# Patient Record
Sex: FEMALE | Race: WHITE | URBAN - METROPOLITAN AREA
[De-identification: names, ages, dates, MRNs, and addresses within clinical notes are randomized per-mention and may not be internally consistent; named-entity substitution may affect disease eponyms.]

---

## 2021-09-10 ENCOUNTER — OFFICE VISIT (OUTPATIENT)
Dept: INTERNAL MEDICINE CLINIC | Age: 66
End: 2021-09-10
Payer: COMMERCIAL

## 2021-09-10 VITALS
HEART RATE: 78 BPM | WEIGHT: 222.6 LBS | OXYGEN SATURATION: 97 % | SYSTOLIC BLOOD PRESSURE: 136 MMHG | HEIGHT: 65 IN | BODY MASS INDEX: 37.09 KG/M2 | DIASTOLIC BLOOD PRESSURE: 74 MMHG

## 2021-09-10 DIAGNOSIS — G47.33 OSA (OBSTRUCTIVE SLEEP APNEA): Primary | ICD-10-CM

## 2021-09-10 DIAGNOSIS — E11.69 TYPE 2 DIABETES MELLITUS WITH OTHER SPECIFIED COMPLICATION, WITH LONG-TERM CURRENT USE OF INSULIN (HCC): ICD-10-CM

## 2021-09-10 DIAGNOSIS — R53.83 OTHER FATIGUE: ICD-10-CM

## 2021-09-10 DIAGNOSIS — I10 ESSENTIAL HYPERTENSION: ICD-10-CM

## 2021-09-10 DIAGNOSIS — Z79.4 TYPE 2 DIABETES MELLITUS WITH OTHER SPECIFIED COMPLICATION, WITH LONG-TERM CURRENT USE OF INSULIN (HCC): ICD-10-CM

## 2021-09-10 DIAGNOSIS — E66.9 OBESITY, UNSPECIFIED CLASSIFICATION, UNSPECIFIED OBESITY TYPE, UNSPECIFIED WHETHER SERIOUS COMORBIDITY PRESENT: ICD-10-CM

## 2021-09-10 PROCEDURE — 99204 OFFICE O/P NEW MOD 45 MIN: CPT | Performed by: INTERNAL MEDICINE

## 2021-09-10 RX ORDER — DULAGLUTIDE 1.5 MG/.5ML
1.5 INJECTION, SOLUTION SUBCUTANEOUS WEEKLY
COMMUNITY

## 2021-09-10 SDOH — ECONOMIC STABILITY: TRANSPORTATION INSECURITY
IN THE PAST 12 MONTHS, HAS THE LACK OF TRANSPORTATION KEPT YOU FROM MEDICAL APPOINTMENTS OR FROM GETTING MEDICATIONS?: NO

## 2021-09-10 SDOH — ECONOMIC STABILITY: FOOD INSECURITY: WITHIN THE PAST 12 MONTHS, YOU WORRIED THAT YOUR FOOD WOULD RUN OUT BEFORE YOU GOT MONEY TO BUY MORE.: NEVER TRUE

## 2021-09-10 SDOH — ECONOMIC STABILITY: TRANSPORTATION INSECURITY
IN THE PAST 12 MONTHS, HAS LACK OF TRANSPORTATION KEPT YOU FROM MEETINGS, WORK, OR FROM GETTING THINGS NEEDED FOR DAILY LIVING?: NO

## 2021-09-10 SDOH — ECONOMIC STABILITY: FOOD INSECURITY: WITHIN THE PAST 12 MONTHS, THE FOOD YOU BOUGHT JUST DIDN'T LAST AND YOU DIDN'T HAVE MONEY TO GET MORE.: NEVER TRUE

## 2021-09-10 ASSESSMENT — PATIENT HEALTH QUESTIONNAIRE - PHQ9
SUM OF ALL RESPONSES TO PHQ QUESTIONS 1-9: 0
1. LITTLE INTEREST OR PLEASURE IN DOING THINGS: 0
2. FEELING DOWN, DEPRESSED OR HOPELESS: 0
SUM OF ALL RESPONSES TO PHQ QUESTIONS 1-9: 0
SUM OF ALL RESPONSES TO PHQ9 QUESTIONS 1 & 2: 0
SUM OF ALL RESPONSES TO PHQ QUESTIONS 1-9: 0

## 2021-09-10 ASSESSMENT — SOCIAL DETERMINANTS OF HEALTH (SDOH): HOW HARD IS IT FOR YOU TO PAY FOR THE VERY BASICS LIKE FOOD, HOUSING, MEDICAL CARE, AND HEATING?: NOT HARD AT ALL

## 2021-09-10 NOTE — PROGRESS NOTES
Subjective:      Patient ID: Burnilda Gallardo is a 77 y.o. female. HPI   Patient is here to establishing care , she has H/o DM on Lantus 90 units BID, Novalog, Truticity , cannot tolerate Metformin   Last HABIc was 8.5 in 6 weeks   Follows with Ophthalmologist   She has HTN on Norvasc , Does not know whether she can take ACE inhibitor or not   She has Single Kidney   She uses CPAP , has WILLARD   HPI   1) Location/Symptom - fatigue   2) Timing/Onset: since COVID in NOV, 2020   3) Context/Setting:she initially gets better after COVID , but then it gets better    4) Quality: Sleepy all day   5) Duration: continuous   6) Modifying Factors: None   7) Severity: moderate   [place] feels Depressed   Go to sleep easily , while Working in [employer]   Last Sleep study was > 5 year , Not very compliant with CPAP   Review of Systems   Constitutional: Positive for fatigue. Negative for activity change, appetite change, chills, diaphoresis and fever. HENT: Negative for congestion, dental problem, drooling and ear discharge. Eyes: Negative for pain, discharge, redness and itching. Respiratory: Negative for apnea, cough, choking, chest tightness and shortness of breath. Cardiovascular: Negative for chest pain and leg swelling. Gastrointestinal: Negative for abdominal distention, abdominal pain, blood in stool, constipation and diarrhea. Endocrine: Negative for cold intolerance and heat intolerance. Genitourinary: Negative for difficulty urinating, dysuria, enuresis, flank pain and frequency. Musculoskeletal: Negative for arthralgias, back pain, gait problem and joint swelling. Skin: Negative for color change, pallor and rash. Neurological: Negative for dizziness, facial asymmetry, light-headedness, numbness and headaches. Psychiatric/Behavioral: Positive for sleep disturbance. Negative for agitation, behavioral problems, confusion, decreased concentration and dysphoric mood.        Objective:   Physical Exam  Constitutional:       Appearance: She is well-developed. She is obese. She is not diaphoretic. HENT:      Head: Normocephalic and atraumatic. Mouth/Throat:      Pharynx: No oropharyngeal exudate. Eyes:      General: No scleral icterus. Right eye: No discharge. Left eye: No discharge. Conjunctiva/sclera: Conjunctivae normal.      Pupils: Pupils are equal, round, and reactive to light. Neck:      Thyroid: No thyromegaly. Vascular: No JVD. Trachea: No tracheal deviation. Comments: Thick neck. Cardiovascular:      Rate and Rhythm: Normal rate. Heart sounds: Normal heart sounds. No murmur heard. No gallop. Pulmonary:      Effort: Pulmonary effort is normal. No respiratory distress. Breath sounds: Normal breath sounds. No stridor. No wheezing or rales. Chest:      Chest wall: No tenderness. Abdominal:      General: Bowel sounds are normal. There is no distension. Palpations: Abdomen is soft. Tenderness: There is no abdominal tenderness. There is no guarding or rebound. Musculoskeletal:         General: Normal range of motion. Cervical back: Normal range of motion. Neurological:      Mental Status: She is alert and oriented to person, place, and time.          Assessment / Plan:     Social History     Socioeconomic History    Marital status:      Spouse name: None    Number of children: None    Years of education: None    Highest education level: None   Occupational History    None   Tobacco Use    Smoking status: Never Smoker    Smokeless tobacco: Never Used   Substance and Sexual Activity    Alcohol use: No    Drug use: No    Sexual activity: None   Other Topics Concern    None   Social History Narrative    None     Social Determinants of Health     Financial Resource Strain: Low Risk     Difficulty of Paying Living Expenses: Not hard at all   Food Insecurity: No Food Insecurity    Worried About Running Out of Food in the Last Year: Never true    [address] in the Last Year: Never true   Transportation Needs: No Transportation Needs    Lack of Transportation (Medical): No    Lack of Transportation (Non-Medical): No   Physical Activity:     Days of Exercise per Week:     Minutes of Exercise per Session:    Stress:     Feeling of Stress :    Social Connections:     Frequency of Communication with Friends and Family:     Frequency of Social Gatherings with Friends and Family:     Attends [employer] Services:     Active Member of Clubs or Organizations:     Attends Club or Organization Meetings:     Marital Status:    Intimate Partner Violence:     Fear of Current or Ex-Partner:     Emotionally Abused:     Physically Abused:     Sexually Abused:         No family history on file. Visit Information    Have you changed or started any medications since your last visit including any over-the-counter medicines, vitamins, or herbal medicines? no   Are you having any side effects from any of your medications? -  no  Have you stopped taking any of your medications? Is so, why? -  no    Have you seen any other physician or provider since your last visit? Yes - Records Obtained  Have you had any other diagnostic tests since your last visit? Yes - Records Obtained  Have you been seen in the emergency room and/or had an admission to a hospital since we last saw you? No  Have you had your routine dental cleaning in the past 6 months? yes -     Have you activated your [employer] account? If not, what are your barriers?  No:      Patient Care Team:  An Hines as PCP - General    Medical History Review  Past Medical, Family, and Social History reviewed and does not contribute to the patient presenting condition    Health Maintenance   Topic Date Due    Hepatitis C screen  Never done    Lipid screen  Never done    DTaP/Tdap/Td vaccine (1 - Tdap) Never done    Colon cancer screen colonoscopy  Never done   [place] Breast cancer screen  Never done    DEXA (modify frequency per FRAX score)  Never done    Pneumococcal 65+ years Vaccine (2 of 2 - PPSV23) 06/26/2020    Shingles Vaccine (2 of 2) 01/27/2021    Flu vaccine (1) Never done    COVID-19 Vaccine  Completed    Hepatitis A vaccine  Aged Out    Hepatitis B vaccine  Aged Out    Hib vaccine  Aged Out    Meningococcal (ACWY) vaccine  Aged Out     1. WILLARD (obstructive sleep apnea)  - Sleep Study with PAP Titration; Future    2. Other fatigue  Advised patient to get sleep study done  - TSH With Reflex Ft4; Future  - CBC; Future  - Comprehensive Metabolic Panel; Future  - Vitamin D 25 Hydroxy; Future  - Vitamin B12 & Folate; Future    3. Essential hypertension  Controlled    4. Type 2 diabetes mellitus with other specified complication, with long-term current use of insulin (HCC)  Last HbA1c is 8.5  Advised to be more compliant with diet and medication    5. Obesity, unspecified classification, unspecified obesity type, unspecified whether serious comorbidity present  Advised to lose weight      · Return in about 6 weeks (around 10/22/2021). · Reviewed prior labs and health maintenance. · Discussed use, benefit, and side effects of prescribed medications. Barriers to medication compliance addressed. All patient questions answered. Pt voiced understanding. Carl Mabry MD  [facility]  9/14/2021, 6:52 PM    Please note that this chart was generated using voice recognition Dragon dictation software. Although every effort was made to ensure the accuracy of this automated transcription, some errors in transcription may have occurred.

## 2021-09-14 ASSESSMENT — ENCOUNTER SYMPTOMS
EYE REDNESS: 0
SHORTNESS OF BREATH: 0
BACK PAIN: 0
ABDOMINAL PAIN: 0
CHOKING: 0
COUGH: 0
BLOOD IN STOOL: 0
EYE DISCHARGE: 0
COLOR CHANGE: 0
CHEST TIGHTNESS: 0
DIARRHEA: 0
ABDOMINAL DISTENTION: 0
CONSTIPATION: 0
APNEA: 0
EYE ITCHING: 0
EYE PAIN: 0

## 2021-09-24 DIAGNOSIS — Z12.31 ENCOUNTER FOR SCREENING MAMMOGRAM FOR MALIGNANT NEOPLASM OF BREAST: Primary | ICD-10-CM

## 2021-10-05 LAB
ABSOLUTE BASO #: 0 X10E9/L (ref 0–0.2)
ABSOLUTE EOS #: 0.2 X10E9/L (ref 0–0.4)
ABSOLUTE LYMPH #: 1.7 X10E9/L (ref 1–3.5)
ABSOLUTE MONO #: 0.5 X10E9/L (ref 0–0.9)
ABSOLUTE NEUT #: 5.6 X10E9/L (ref 1.5–6.6)
ALBUMIN SERPL-MCNC: 3.9 G/DL (ref 3.2–5.3)
ALK PHOSPHATASE: 105 U/L (ref 39–130)
ALT SERPL-CCNC: 14 U/L (ref 0–31)
ANION GAP SERPL CALCULATED.3IONS-SCNC: 10 MMOL/L (ref 5–15)
AST SERPL-CCNC: 13 U/L (ref 0–41)
BASOPHILS RELATIVE PERCENT: 0.4 %
BILIRUB SERPL-MCNC: 0.7 MG/DL (ref 0.3–1.2)
BUN BLDV-MCNC: 12 MG/DL (ref 5–27)
CALCIUM SERPL-MCNC: 9.2 MG/DL (ref 8.5–10.5)
CHLORIDE BLD-SCNC: 103 MMOL/L (ref 98–109)
CO2: 27 MMOL/L (ref 22–32)
CREAT SERPL-MCNC: 0.88 MG/DL (ref 0.4–1)
EGFR AFRICAN AMERICAN: >60 ML/MIN/1.73SQ.M
EGFR IF NONAFRICAN AMERICAN: >60 ML/MIN/1.73SQ.M
EOSINOPHILS RELATIVE PERCENT: 2 %
FOLATE: 9.7 NG/ML
GLUCOSE: 271 MG/DL (ref 65–99)
HCT VFR BLD CALC: 42.7 % (ref 35–47)
HEMOGLOBIN: 14.5 G/DL (ref 11.7–15.5)
LYMPHOCYTE %: 21.6 %
MCH RBC QN AUTO: 30.7 PG (ref 27–34)
MCHC RBC AUTO-ENTMCNC: 34 G/DL (ref 32–36)
MCV RBC AUTO: 91 FL (ref 80–100)
MONOCYTES # BLD: 6 %
NEUTROPHILS RELATIVE PERCENT: 70 %
PDW BLD-RTO: 13.7 % (ref 11.5–15)
PLATELETS: 255 X10E9/L (ref 150–450)
PMV BLD AUTO: 7.8 FL (ref 7–12)
POTASSIUM SERPL-SCNC: 4.3 MMOL/L (ref 3.5–5)
RBC: 4.72 X10E12/L (ref 3.8–5.2)
SODIUM BLD-SCNC: 140 MMOL/L (ref 134–146)
TOTAL PROTEIN: 7 G/DL (ref 6–8)
TSH SERPL DL<=0.05 MIU/L-ACNC: 3.66 UIU/ML (ref 0.49–4.67)
VITAMIN B-12: 186 PG/ML (ref 180–914)
VITAMIN D 25-HYDROXY: 31.3 NG/ML (ref 30–100)
WBC: 8 X10E9/L (ref 4–11)

## 2021-10-06 ENCOUNTER — TELEPHONE (OUTPATIENT)
Dept: INTERNAL MEDICINE CLINIC | Age: 66
End: 2021-10-06

## 2021-10-06 DIAGNOSIS — E53.8 VITAMIN B12 DEFICIENCY: Primary | ICD-10-CM

## 2021-12-09 DIAGNOSIS — Z12.31 ENCOUNTER FOR SCREENING MAMMOGRAM FOR MALIGNANT NEOPLASM OF BREAST: ICD-10-CM

## 2022-03-03 DIAGNOSIS — E53.8 VITAMIN B12 DEFICIENCY: ICD-10-CM

## 2022-03-04 RX ORDER — PSYLLIUM HUSK 3.4 G/7G
POWDER ORAL
Qty: 30 TABLET | Refills: 1 | Status: SHIPPED | OUTPATIENT
Start: 2022-03-04 | End: 2022-05-06

## 2022-05-06 DIAGNOSIS — E53.8 VITAMIN B12 DEFICIENCY: ICD-10-CM

## 2022-05-06 RX ORDER — LANOLIN ALCOHOL/MO/W.PET/CERES
CREAM (GRAM) TOPICAL
Qty: 90 TABLET | Refills: 3 | Status: SHIPPED | OUTPATIENT
Start: 2022-05-06

## 2023-04-04 LAB
AVERAGE GLUCOSE: NORMAL
HBA1C MFR BLD: 9.1 %

## 2023-06-13 ENCOUNTER — OFFICE VISIT (OUTPATIENT)
Age: 68
End: 2023-06-13
Payer: COMMERCIAL

## 2023-06-13 VITALS
BODY MASS INDEX: 39.95 KG/M2 | SYSTOLIC BLOOD PRESSURE: 149 MMHG | HEIGHT: 64 IN | RESPIRATION RATE: 16 BRPM | TEMPERATURE: 98.4 F | WEIGHT: 234 LBS | HEART RATE: 66 BPM | DIASTOLIC BLOOD PRESSURE: 73 MMHG

## 2023-06-13 DIAGNOSIS — I10 PRIMARY HYPERTENSION: ICD-10-CM

## 2023-06-13 DIAGNOSIS — B35.4 TINEA CORPORIS: Primary | ICD-10-CM

## 2023-06-13 PROCEDURE — 1123F ACP DISCUSS/DSCN MKR DOCD: CPT | Performed by: STUDENT IN AN ORGANIZED HEALTH CARE EDUCATION/TRAINING PROGRAM

## 2023-06-13 PROCEDURE — 3078F DIAST BP <80 MM HG: CPT | Performed by: STUDENT IN AN ORGANIZED HEALTH CARE EDUCATION/TRAINING PROGRAM

## 2023-06-13 PROCEDURE — 3074F SYST BP LT 130 MM HG: CPT | Performed by: STUDENT IN AN ORGANIZED HEALTH CARE EDUCATION/TRAINING PROGRAM

## 2023-06-13 PROCEDURE — 99213 OFFICE O/P EST LOW 20 MIN: CPT | Performed by: STUDENT IN AN ORGANIZED HEALTH CARE EDUCATION/TRAINING PROGRAM

## 2023-06-13 RX ORDER — DULAGLUTIDE 0.75 MG/.5ML
0.75 INJECTION, SOLUTION SUBCUTANEOUS
COMMUNITY
Start: 2023-04-04

## 2023-06-13 RX ORDER — KETOCONAZOLE 20 MG/G
1 CREAM TOPICAL 2 TIMES DAILY
Qty: 60 G | Refills: 0 | Status: SHIPPED | OUTPATIENT
Start: 2023-06-13 | End: 2023-06-27

## 2023-06-13 RX ORDER — EPINEPHRINE 0.3 MG/.3ML
0.3 INJECTION SUBCUTANEOUS AS NEEDED
COMMUNITY
Start: 2021-06-01 | End: 2023-06-13 | Stop reason: SDUPTHER

## 2023-06-13 RX ORDER — AMLODIPINE BESYLATE AND ATORVASTATIN CALCIUM 2.5; 4 MG/1; MG/1
TABLET, FILM COATED ORAL
COMMUNITY
End: 2023-06-20

## 2023-06-13 RX ORDER — LEVOTHYROXINE SODIUM 0.03 MG/1
1 TABLET ORAL DAILY
COMMUNITY
Start: 2023-06-08

## 2023-06-13 RX ORDER — AMLODIPINE BESYLATE 5 MG/1
1 TABLET ORAL DAILY
COMMUNITY
Start: 2023-06-08

## 2023-06-13 RX ORDER — SPIRONOLACTONE 25 MG/1
1 TABLET ORAL DAILY
COMMUNITY
Start: 2023-06-04

## 2023-06-13 RX ORDER — CARVEDILOL 25 MG/1
1 TABLET ORAL 2 TIMES DAILY
COMMUNITY
Start: 2023-06-08

## 2023-06-13 RX ORDER — PEN NEEDLE, DIABETIC 29 G X1/2"
NEEDLE, DISPOSABLE MISCELLANEOUS
COMMUNITY
Start: 2023-04-12

## 2023-06-13 RX ORDER — ERYTHROMYCIN 20 MG/G
GEL TOPICAL
COMMUNITY
Start: 2023-05-26

## 2023-06-13 SDOH — ECONOMIC STABILITY: INCOME INSECURITY: HOW HARD IS IT FOR YOU TO PAY FOR THE VERY BASICS LIKE FOOD, HOUSING, MEDICAL CARE, AND HEATING?: NOT HARD AT ALL

## 2023-06-13 SDOH — ECONOMIC STABILITY: FOOD INSECURITY: WITHIN THE PAST 12 MONTHS, YOU WORRIED THAT YOUR FOOD WOULD RUN OUT BEFORE YOU GOT MONEY TO BUY MORE.: NEVER TRUE

## 2023-06-13 SDOH — ECONOMIC STABILITY: HOUSING INSECURITY
IN THE LAST 12 MONTHS, WAS THERE A TIME WHEN YOU DID NOT HAVE A STEADY PLACE TO SLEEP OR SLEPT IN A SHELTER (INCLUDING NOW)?: NO

## 2023-06-13 SDOH — ECONOMIC STABILITY: FOOD INSECURITY: WITHIN THE PAST 12 MONTHS, THE FOOD YOU BOUGHT JUST DIDN'T LAST AND YOU DIDN'T HAVE MONEY TO GET MORE.: NEVER TRUE

## 2023-06-13 ASSESSMENT — PATIENT HEALTH QUESTIONNAIRE - PHQ9
SUM OF ALL RESPONSES TO PHQ QUESTIONS 1-9: 0
SUM OF ALL RESPONSES TO PHQ9 QUESTIONS 1 & 2: 0
SUM OF ALL RESPONSES TO PHQ QUESTIONS 1-9: 0
1. LITTLE INTEREST OR PLEASURE IN DOING THINGS: 0
SUM OF ALL RESPONSES TO PHQ QUESTIONS 1-9: 0
SUM OF ALL RESPONSES TO PHQ QUESTIONS 1-9: 0
2. FEELING DOWN, DEPRESSED OR HOPELESS: 0

## 2023-06-16 ASSESSMENT — ENCOUNTER SYMPTOMS
COUGH: 0
ABDOMINAL PAIN: 0
ABDOMINAL DISTENTION: 0
SHORTNESS OF BREATH: 0

## 2023-07-07 DIAGNOSIS — E78.5 HYPERLIPIDEMIA, UNSPECIFIED HYPERLIPIDEMIA TYPE: Primary | ICD-10-CM

## 2023-07-07 RX ORDER — PROCHLORPERAZINE 25 MG/1
1 SUPPOSITORY RECTAL
COMMUNITY
Start: 2023-04-04

## 2023-07-12 DIAGNOSIS — I10 PRIMARY HYPERTENSION: Primary | ICD-10-CM

## 2023-07-12 DIAGNOSIS — E03.9 HYPOTHYROIDISM, UNSPECIFIED TYPE: ICD-10-CM

## 2023-07-12 DIAGNOSIS — E78.5 HYPERLIPIDEMIA, UNSPECIFIED HYPERLIPIDEMIA TYPE: ICD-10-CM

## 2023-07-12 RX ORDER — AMLODIPINE BESYLATE 5 MG/1
5 TABLET ORAL DAILY
Qty: 60 TABLET | Refills: 0 | Status: SHIPPED | OUTPATIENT
Start: 2023-07-12

## 2023-07-12 RX ORDER — LEVOTHYROXINE SODIUM 0.03 MG/1
25 TABLET ORAL EVERY MORNING
Qty: 60 TABLET | Refills: 0 | Status: SHIPPED | OUTPATIENT
Start: 2023-07-12

## 2023-07-12 RX ORDER — CARVEDILOL 25 MG/1
25 TABLET ORAL 2 TIMES DAILY WITH MEALS
Qty: 120 TABLET | Refills: 0 | Status: SHIPPED | OUTPATIENT
Start: 2023-07-12

## 2023-07-17 LAB
AVERAGE GLUCOSE: NORMAL
HBA1C MFR BLD: 10.3 %

## 2023-07-19 ENCOUNTER — TELEPHONE (OUTPATIENT)
Age: 68
End: 2023-07-19

## 2023-07-19 DIAGNOSIS — E78.5 HYPERLIPIDEMIA, UNSPECIFIED HYPERLIPIDEMIA TYPE: Primary | ICD-10-CM

## 2023-07-19 RX ORDER — PITAVASTATIN CALCIUM 4.18 MG/1
4 TABLET, FILM COATED ORAL NIGHTLY
Qty: 30 TABLET | Refills: 0 | Status: SHIPPED | OUTPATIENT
Start: 2023-07-19

## 2023-07-19 NOTE — TELEPHONE ENCOUNTER
REC'D CALL FROM [facility] TO DUE A PRIOR AUTH OVER THE PHONE.  DONE, CASE ID #[id number] AND THEY SHOULD HAVE ANSWER WITHIN 72 HOURS.

## 2023-07-19 NOTE — TELEPHONE ENCOUNTER
There is a delay with mail order pharmacy sending her Livalo. Asking if you would send a month supply for her local pharmacy (she is in [facility] for the next 2 weeks). Please send to [facility] in [place], [place]. Mail order said she should receive her order in 2 wees.

## 2023-07-27 ENCOUNTER — TELEPHONE (OUTPATIENT)
Age: 68
End: 2023-07-27

## 2023-07-27 NOTE — TELEPHONE ENCOUNTER
Patient was recently seen at [facility] adult endocrinology, please see care everywhere for progress note

## 2023-07-28 DIAGNOSIS — E78.5 HYPERLIPIDEMIA, UNSPECIFIED HYPERLIPIDEMIA TYPE: ICD-10-CM

## 2023-08-09 DIAGNOSIS — I10 PRIMARY HYPERTENSION: Primary | ICD-10-CM

## 2023-08-09 RX ORDER — SPIRONOLACTONE 25 MG/1
25 TABLET ORAL DAILY
Qty: 90 TABLET | Refills: 0 | Status: SHIPPED | OUTPATIENT
Start: 2023-08-09

## 2023-08-14 ASSESSMENT — ENCOUNTER SYMPTOMS
ABDOMINAL PAIN: 0
SORE THROAT: 0
SHORTNESS OF BREATH: 0
DIARRHEA: 0
WHEEZING: 0
COUGH: 0
BACK PAIN: 0
EYE PAIN: 0
CONSTIPATION: 0
VOMITING: 0
NAUSEA: 0
RHINORRHEA: 0

## 2023-08-14 NOTE — PATIENT INSTRUCTIONS
Thank you for following up with us at [facility] outpatient residency clinic! It was a pleasure to meet you today! Our plan is the following:  - Please get your labs done right after our visit. - Please contact Dr. Isadore Dakins office and let them know about the two episodes of hypoglycemia you had and your intolerance to Trulicity and your elevated blood sugars.  - Please take spironolactone twice daily to aid with controlling your blood pressures. Call the office if you have any issues with this medication. If you have any additional questions or concerns, please call the office ([phone]) and speak to one of the staff. They will triage and forward the message to the doctors! Have a great rest of your day!

## 2023-08-14 NOTE — PROGRESS NOTES
[id number] [facility]  [address] [facility], [facility]  Phone: [phone]  Fax: [phone]      Date of Visit:  2023  Patient Name: Jose G Wong   Patient :  1955     HPI:     Jose G Wong is a 76 y.o. female who presents today to discuss   Chief Complaint   Patient presents with    Hypertension     State home BP ranges about the same as what we get here in the office    Hypothyroidism     Did not get labs done. Patient presents to follow up on hypertension and hypothyroidism. She follows with Dr. Kel Pace for T2DM. Hypertension:  Patient states home blood pressures have been running 178-225R systolic. She is only taking amlodipine 5 mg QD and spironolactone 25 mg QD. Dr. Kel Pace did increase her spironolactone to 25 mg BID but patient was worried that that may cause her to urinate more. Hypothyroidism:  Patient is on levothyroxine 25 mcg QAM. She did not do her labs prior to this visit. She will get that done after today's visit. T2DM:  Patient reports two hypoglycemic episodes this year. Both times her sugars were in the 60s and blood pressure elevated in the 273Z systolic. She complained of lightheadedness and feeling unwell. First episode occurred in the springtime. EMS was called and patient was seen at [facility]. Second episode happened about 3 week ago and patient was given food and drink for hypoglycemia. BP came down once blood sugars were back to normal. Patient also states she has not been taking Trulicity for the past couple of months due to GI upset. She did not increase from Trulicity 1.5 to 3.0 mg. Her morning fasting blood sugars range from the 120 - 200s. Nighttime blood sugars are about 300 despite insulin use. Diarrhea:  Patient is complaining of intermittent diarrhea for the past month. It can occur twice weekly with several episodes in one day.  She denies any recent illnesses, sick contacts or

## 2023-08-16 ENCOUNTER — OFFICE VISIT (OUTPATIENT)
Age: 68
End: 2023-08-16

## 2023-08-16 ENCOUNTER — HOSPITAL ENCOUNTER (OUTPATIENT)
Age: 68
Setting detail: SPECIMEN
Discharge: HOME | End: 2023-08-16

## 2023-08-16 VITALS
BODY MASS INDEX: 40.56 KG/M2 | WEIGHT: 236.3 LBS | RESPIRATION RATE: 18 BRPM | HEART RATE: 57 BPM | SYSTOLIC BLOOD PRESSURE: 141 MMHG | TEMPERATURE: 98 F | DIASTOLIC BLOOD PRESSURE: 66 MMHG

## 2023-08-16 DIAGNOSIS — E03.9 HYPOTHYROIDISM, UNSPECIFIED TYPE: ICD-10-CM

## 2023-08-16 DIAGNOSIS — Z79.4 TYPE 2 DIABETES MELLITUS WITHOUT COMPLICATION, WITH LONG-TERM CURRENT USE OF INSULIN (HCC): ICD-10-CM

## 2023-08-16 DIAGNOSIS — E78.5 HYPERLIPIDEMIA, UNSPECIFIED HYPERLIPIDEMIA TYPE: ICD-10-CM

## 2023-08-16 DIAGNOSIS — E03.8 OTHER SPECIFIED HYPOTHYROIDISM: ICD-10-CM

## 2023-08-16 DIAGNOSIS — I10 ESSENTIAL HYPERTENSION: Primary | ICD-10-CM

## 2023-08-16 DIAGNOSIS — R19.7 DIARRHEA, UNSPECIFIED TYPE: ICD-10-CM

## 2023-08-16 DIAGNOSIS — Z12.11 COLON CANCER SCREENING: ICD-10-CM

## 2023-08-16 DIAGNOSIS — E66.01 CLASS 3 SEVERE OBESITY DUE TO EXCESS CALORIES WITHOUT SERIOUS COMORBIDITY WITH BODY MASS INDEX (BMI) OF 40.0 TO 44.9 IN ADULT (HCC): ICD-10-CM

## 2023-08-16 DIAGNOSIS — I10 PRIMARY HYPERTENSION: ICD-10-CM

## 2023-08-16 DIAGNOSIS — Z78.9 NONSMOKER: ICD-10-CM

## 2023-08-16 DIAGNOSIS — E11.9 TYPE 2 DIABETES MELLITUS WITHOUT COMPLICATION, WITH LONG-TERM CURRENT USE OF INSULIN (HCC): ICD-10-CM

## 2023-08-16 DIAGNOSIS — E78.00 HYPERCHOLESTEROLEMIA: ICD-10-CM

## 2023-08-16 PROBLEM — C80.1 MALIGNANT NEOPLASM (HCC): Status: ACTIVE | Noted: 2023-08-16

## 2023-08-16 PROBLEM — K57.92 DIVERTICULITIS: Status: ACTIVE | Noted: 2023-08-16

## 2023-08-16 PROBLEM — K21.9 GASTROESOPHAGEAL REFLUX DISEASE: Status: ACTIVE | Noted: 2023-08-16

## 2023-08-16 LAB
ANION GAP SERPL CALCULATED.3IONS-SCNC: 14 MMOL/L (ref 9–17)
BUN SERPL-MCNC: 17 MG/DL (ref 8–23)
CALCIUM SERPL-MCNC: 9.5 MG/DL (ref 8.6–10.4)
CHLORIDE SERPL-SCNC: 105 MMOL/L (ref 98–107)
CHOLEST SERPL-MCNC: 133 MG/DL
CHOLESTEROL/HDL RATIO: 2.6
CO2 SERPL-SCNC: 21 MMOL/L (ref 20–31)
CREAT SERPL-MCNC: 1.1 MG/DL (ref 0.5–0.9)
GFR SERPL CREATININE-BSD FRML MDRD: 55 ML/MIN/1.73M2
GLUCOSE SERPL-MCNC: 139 MG/DL (ref 70–99)
HDLC SERPL-MCNC: 51 MG/DL
LDLC SERPL CALC-MCNC: 63 MG/DL (ref 0–130)
POTASSIUM SERPL-SCNC: 4.4 MMOL/L (ref 3.7–5.3)
SODIUM SERPL-SCNC: 140 MMOL/L (ref 135–144)
TRIGL SERPL-MCNC: 96 MG/DL
TSH SERPL DL<=0.05 MIU/L-ACNC: 2.78 UIU/ML (ref 0.3–5)

## 2023-08-16 RX ORDER — PROCHLORPERAZINE 25 MG/1
SUPPOSITORY RECTAL
COMMUNITY
Start: 2023-06-13

## 2023-08-24 DIAGNOSIS — I10 PRIMARY HYPERTENSION: ICD-10-CM

## 2023-08-25 RX ORDER — SPIRONOLACTONE 25 MG/1
25 TABLET ORAL 2 TIMES DAILY
Qty: 120 TABLET | Refills: 0 | Status: SHIPPED | OUTPATIENT
Start: 2023-08-25

## 2023-09-05 DIAGNOSIS — E03.9 HYPOTHYROIDISM, UNSPECIFIED TYPE: ICD-10-CM

## 2023-09-05 DIAGNOSIS — I10 PRIMARY HYPERTENSION: ICD-10-CM

## 2023-09-05 RX ORDER — AMLODIPINE BESYLATE 5 MG/1
5 TABLET ORAL DAILY
Qty: 90 TABLET | Refills: 0 | Status: SHIPPED | OUTPATIENT
Start: 2023-09-05

## 2023-09-05 RX ORDER — CARVEDILOL 25 MG/1
25 TABLET ORAL 2 TIMES DAILY WITH MEALS
Qty: 180 TABLET | Refills: 0 | Status: SHIPPED | OUTPATIENT
Start: 2023-09-05

## 2023-09-05 RX ORDER — LEVOTHYROXINE SODIUM 0.03 MG/1
25 TABLET ORAL EVERY MORNING
Qty: 90 TABLET | Refills: 0 | Status: SHIPPED | OUTPATIENT
Start: 2023-09-05

## 2023-09-18 ASSESSMENT — ENCOUNTER SYMPTOMS
DIARRHEA: 0
NAUSEA: 0
SHORTNESS OF BREATH: 0
CONSTIPATION: 0
VOMITING: 0
ABDOMINAL PAIN: 0
COUGH: 0

## 2023-09-20 LAB
AVERAGE GLUCOSE: NORMAL
HBA1C MFR BLD: 9.6 %

## 2023-09-22 ENCOUNTER — OFFICE VISIT (OUTPATIENT)
Age: 68
End: 2023-09-22
Payer: COMMERCIAL

## 2023-09-22 VITALS
SYSTOLIC BLOOD PRESSURE: 174 MMHG | DIASTOLIC BLOOD PRESSURE: 59 MMHG | BODY MASS INDEX: 40.75 KG/M2 | HEART RATE: 57 BPM | WEIGHT: 237.4 LBS | RESPIRATION RATE: 12 BRPM

## 2023-09-22 DIAGNOSIS — E11.9 TYPE 2 DIABETES MELLITUS WITHOUT COMPLICATION, WITH LONG-TERM CURRENT USE OF INSULIN (HCC): ICD-10-CM

## 2023-09-22 DIAGNOSIS — E03.8 OTHER SPECIFIED HYPOTHYROIDISM: ICD-10-CM

## 2023-09-22 DIAGNOSIS — I10 PRIMARY HYPERTENSION: Primary | ICD-10-CM

## 2023-09-22 DIAGNOSIS — Z78.9 NONSMOKER: ICD-10-CM

## 2023-09-22 DIAGNOSIS — E78.00 HYPERCHOLESTEROLEMIA: ICD-10-CM

## 2023-09-22 DIAGNOSIS — F41.1 GAD (GENERALIZED ANXIETY DISORDER): ICD-10-CM

## 2023-09-22 DIAGNOSIS — E66.01 CLASS 3 SEVERE OBESITY DUE TO EXCESS CALORIES WITHOUT SERIOUS COMORBIDITY WITH BODY MASS INDEX (BMI) OF 40.0 TO 44.9 IN ADULT (HCC): ICD-10-CM

## 2023-09-22 DIAGNOSIS — Z79.4 TYPE 2 DIABETES MELLITUS WITHOUT COMPLICATION, WITH LONG-TERM CURRENT USE OF INSULIN (HCC): ICD-10-CM

## 2023-09-22 PROCEDURE — 3078F DIAST BP <80 MM HG: CPT | Performed by: FAMILY MEDICINE

## 2023-09-22 PROCEDURE — 3046F HEMOGLOBIN A1C LEVEL >9.0%: CPT | Performed by: FAMILY MEDICINE

## 2023-09-22 PROCEDURE — 3074F SYST BP LT 130 MM HG: CPT | Performed by: FAMILY MEDICINE

## 2023-09-22 PROCEDURE — 99214 OFFICE O/P EST MOD 30 MIN: CPT | Performed by: FAMILY MEDICINE

## 2023-09-22 PROCEDURE — 99211 OFF/OP EST MAY X REQ PHY/QHP: CPT

## 2023-09-22 PROCEDURE — 1123F ACP DISCUSS/DSCN MKR DOCD: CPT | Performed by: FAMILY MEDICINE

## 2023-09-22 RX ORDER — SPIRONOLACTONE 50 MG/1
1 TABLET, FILM COATED ORAL DAILY
COMMUNITY
Start: 2023-09-20

## 2023-09-22 RX ORDER — SYRING-NEEDL,DISP,INSUL,0.3 ML 30 GX5/16"
1 SYRINGE, EMPTY DISPOSABLE MISCELLANEOUS PRN
COMMUNITY
Start: 2023-09-20

## 2023-09-22 RX ORDER — SERTRALINE HYDROCHLORIDE 25 MG/1
25 TABLET, FILM COATED ORAL DAILY
Qty: 30 TABLET | Refills: 0 | Status: SHIPPED | OUTPATIENT
Start: 2023-09-22

## 2023-09-22 RX ORDER — SEMAGLUTIDE 0.68 MG/ML
0.25 INJECTION, SOLUTION SUBCUTANEOUS WEEKLY
COMMUNITY
Start: 2023-09-20

## 2023-09-22 NOTE — PATIENT INSTRUCTIONS
Thank you for following up with us at [facility] outpatient residency clinic! It was a pleasure to meet you today! Our plan is the following:  - Please  your medications from the pharmacy. - Let me know how your blood pressures are on spironolactone 50 mg and on the sertraline 25 mg.   - Your most recent HbA1c was 9.6 down from 10.3, so you are doing a good job!  - Keep eating healthy, staying hydrated and try to walk or exercise for 30 minutes 2-3 times weekly. If you have any additional questions or concerns, please call the office ([phone]) and speak to one of the staff. They will triage and forward the message to the doctors! Have a great rest of your day!

## 2023-09-24 PROBLEM — E66.813 CLASS 3 SEVERE OBESITY DUE TO EXCESS CALORIES WITHOUT SERIOUS COMORBIDITY WITH BODY MASS INDEX (BMI) OF 40.0 TO 44.9 IN ADULT: Status: ACTIVE | Noted: 2023-09-24

## 2023-09-24 PROBLEM — E66.01 CLASS 3 SEVERE OBESITY DUE TO EXCESS CALORIES WITHOUT SERIOUS COMORBIDITY WITH BODY MASS INDEX (BMI) OF 40.0 TO 44.9 IN ADULT (HCC): Status: ACTIVE | Noted: 2023-09-24

## 2023-09-27 NOTE — PROGRESS NOTES
Attending Physician Statement  I have seen and discussed the care of Brandon Eber  including pertinent history and exam findings, with the resident. I have reviewed the key elements of all parts of the encounter with the resident at the time of the encounter. I agree with the assessment, plan and orders as documented by the resident. Lucille Harris.  Domingo Spears MD   9/27/2023
tolerate higher doses of Trulicity; was switched to Ozempic by Endocrine  - Continue insulin glargine, lispro and semaglutide  3. Other specified hypothyroidism   - WNL   - Continue lecothyroxine 25 mcg  4. Hypercholesterolemia   - WNL, well controlled    - Continue pitavastatin 4 mg  5. ADELINA (generalized anxiety disorder)   - Prescription for sertraline 25 mg QD  6. Class 3 severe obesity due to excess calories without serious comorbidity with body mass index (BMI) of 40.0 to 44.9 in adult (HCC)  - BMI was elevated today, and weight loss plan recommended is : daily exercise regimen. 7. Nonsmoker     Return in about 1 month (around 10/22/2023) for HTN/Anxiety. - Questions/concerns answered. Patient verbalized and expressed understanding. Medications, laboratory testing, imaging, consultation, and follow up as documented in this encounter. Please be aware portions of this note were completed using voice recognition software and unforeseen errors may have occurred    Patient was seen and discussed with preceptor  Dr. Riddhi Bahena  at the time of the encounter.      Electronically signed by Don Rankin MD on 9/24/2023 at 7:58 PM

## 2023-10-15 DIAGNOSIS — F41.1 GAD (GENERALIZED ANXIETY DISORDER): ICD-10-CM

## 2023-10-16 RX ORDER — SERTRALINE HYDROCHLORIDE 25 MG/1
25 TABLET, FILM COATED ORAL DAILY
Qty: 30 TABLET | Refills: 0 | Status: SHIPPED | OUTPATIENT
Start: 2023-10-16

## 2023-11-02 PROBLEM — N18.31 HYPERTENSION ASSOCIATED WITH STAGE 3A CHRONIC KIDNEY DISEASE DUE TO TYPE 2 DIABETES MELLITUS (HCC): Status: ACTIVE | Noted: 2023-11-02

## 2023-11-02 PROBLEM — E11.22 HYPERTENSION ASSOCIATED WITH STAGE 3A CHRONIC KIDNEY DISEASE DUE TO TYPE 2 DIABETES MELLITUS (HCC): Status: ACTIVE | Noted: 2023-11-02

## 2023-11-02 PROBLEM — I12.9 HYPERTENSION ASSOCIATED WITH STAGE 3A CHRONIC KIDNEY DISEASE DUE TO TYPE 2 DIABETES MELLITUS (HCC): Status: ACTIVE | Noted: 2023-11-02

## 2023-11-02 PROBLEM — I10 PRIMARY HYPERTENSION: Status: RESOLVED | Noted: 2023-01-05 | Resolved: 2023-11-02

## 2023-12-10 DIAGNOSIS — E03.9 HYPOTHYROIDISM, UNSPECIFIED TYPE: ICD-10-CM

## 2023-12-10 DIAGNOSIS — I10 PRIMARY HYPERTENSION: ICD-10-CM

## 2023-12-11 RX ORDER — AMLODIPINE BESYLATE 5 MG/1
5 TABLET ORAL DAILY
Qty: 90 TABLET | Refills: 0 | Status: SHIPPED | OUTPATIENT
Start: 2023-12-11

## 2023-12-11 RX ORDER — CARVEDILOL 25 MG/1
25 TABLET ORAL 2 TIMES DAILY WITH MEALS
Qty: 180 TABLET | Refills: 0 | Status: SHIPPED | OUTPATIENT
Start: 2023-12-11

## 2023-12-11 RX ORDER — LEVOTHYROXINE SODIUM 0.03 MG/1
25 TABLET ORAL EVERY MORNING
Qty: 90 TABLET | Refills: 0 | Status: SHIPPED | OUTPATIENT
Start: 2023-12-11

## 2024-01-02 DIAGNOSIS — I10 PRIMARY HYPERTENSION: ICD-10-CM

## 2024-01-04 RX ORDER — SPIRONOLACTONE 25 MG/1
25 TABLET ORAL 2 TIMES DAILY
Qty: 120 TABLET | Refills: 0 | OUTPATIENT
Start: 2024-01-04

## 2024-01-17 ENCOUNTER — HOSPITAL ENCOUNTER (OUTPATIENT)
Age: 69
Setting detail: SPECIMEN
Discharge: HOME | End: 2024-01-17

## 2024-01-17 ENCOUNTER — OFFICE VISIT (OUTPATIENT)
Age: 69
End: 2024-01-17
Payer: COMMERCIAL

## 2024-01-17 ENCOUNTER — HOSPITAL ENCOUNTER (OUTPATIENT)
Dept: MAMMOGRAPHY | Age: 69
Discharge: HOME | End: 2024-01-19
Payer: COMMERCIAL

## 2024-01-17 ENCOUNTER — HOSPITAL ENCOUNTER (OUTPATIENT)
Dept: ULTRASOUND IMAGING | Age: 69
Discharge: HOME | End: 2024-01-19
Payer: COMMERCIAL

## 2024-01-17 VITALS
SYSTOLIC BLOOD PRESSURE: 127 MMHG | HEIGHT: 64 IN | BODY MASS INDEX: 39.88 KG/M2 | RESPIRATION RATE: 16 BRPM | DIASTOLIC BLOOD PRESSURE: 59 MMHG | HEART RATE: 58 BPM | TEMPERATURE: 98.1 F | WEIGHT: 233.6 LBS

## 2024-01-17 VITALS — HEIGHT: 65 IN | WEIGHT: 232 LBS | BODY MASS INDEX: 38.65 KG/M2

## 2024-01-17 DIAGNOSIS — N64.52 NIPPLE DISCHARGE: ICD-10-CM

## 2024-01-17 DIAGNOSIS — Z90.5 ACQUIRED ABSENCE OF KIDNEY: ICD-10-CM

## 2024-01-17 DIAGNOSIS — N64.52 NIPPLE DISCHARGE: Primary | ICD-10-CM

## 2024-01-17 DIAGNOSIS — N64.4 BREAST PAIN: ICD-10-CM

## 2024-01-17 DIAGNOSIS — N64.53 ABNORMAL RETRACTION OF LEFT NIPPLE: ICD-10-CM

## 2024-01-17 DIAGNOSIS — R35.0 INCREASED URINARY FREQUENCY: ICD-10-CM

## 2024-01-17 LAB
ALBUMIN SERPL-MCNC: 4 G/DL (ref 3.5–5.2)
ALBUMIN/GLOB SERPL: 1 {RATIO} (ref 1–2.5)
ALP SERPL-CCNC: 97 U/L (ref 35–104)
ALT SERPL-CCNC: 16 U/L (ref 10–35)
ANION GAP SERPL CALCULATED.3IONS-SCNC: 10 MMOL/L (ref 9–16)
AST SERPL-CCNC: 17 U/L (ref 10–35)
BASOPHILS # BLD: 0.03 K/UL (ref 0–0.2)
BASOPHILS NFR BLD: 0 % (ref 0–2)
BILIRUB SERPL-MCNC: 0.7 MG/DL (ref 0–1.2)
BILIRUBIN, POC: NEGATIVE
BLOOD URINE, POC: NEGATIVE
BUN SERPL-MCNC: 17 MG/DL (ref 8–23)
CALCIUM SERPL-MCNC: 9.3 MG/DL (ref 8.6–10.4)
CHLORIDE SERPL-SCNC: 104 MMOL/L (ref 98–107)
CLARITY, POC: CLEAR
CO2 SERPL-SCNC: 25 MMOL/L (ref 20–31)
COLOR, POC: YELLOW
CREAT SERPL-MCNC: 1 MG/DL (ref 0.5–0.9)
EOSINOPHIL # BLD: 0.19 K/UL (ref 0–0.44)
EOSINOPHILS RELATIVE PERCENT: 2 % (ref 1–4)
ERYTHROCYTE [DISTWIDTH] IN BLOOD BY AUTOMATED COUNT: 13 % (ref 11.8–14.4)
GFR SERPL CREATININE-BSD FRML MDRD: >60 ML/MIN/1.73M2
GLUCOSE SERPL-MCNC: 134 MG/DL (ref 74–99)
GLUCOSE URINE, POC: 250
HCT VFR BLD AUTO: 42.7 % (ref 36.3–47.1)
HGB BLD-MCNC: 14.6 G/DL (ref 11.9–15.1)
IMM GRANULOCYTES # BLD AUTO: 0.05 K/UL (ref 0–0.3)
IMM GRANULOCYTES NFR BLD: 1 %
KETONES, POC: NEGATIVE
LEUKOCYTE EST, POC: NEGATIVE
LYMPHOCYTES NFR BLD: 1.92 K/UL (ref 1.1–3.7)
LYMPHOCYTES RELATIVE PERCENT: 22 % (ref 24–43)
MCH RBC QN AUTO: 30.9 PG (ref 25.2–33.5)
MCHC RBC AUTO-ENTMCNC: 34.2 G/DL (ref 28.4–34.8)
MCV RBC AUTO: 90.5 FL (ref 82.6–102.9)
MONOCYTES NFR BLD: 0.65 K/UL (ref 0.1–1.2)
MONOCYTES NFR BLD: 8 % (ref 3–12)
NEUTROPHILS NFR BLD: 67 % (ref 36–65)
NEUTS SEG NFR BLD: 5.81 K/UL (ref 1.5–8.1)
NITRITE, POC: NEGATIVE
NRBC BLD-RTO: 0 PER 100 WBC
PH, POC: 6
PLATELET # BLD AUTO: 227 K/UL (ref 138–453)
PMV BLD AUTO: 9.8 FL (ref 8.1–13.5)
POTASSIUM SERPL-SCNC: 4.3 MMOL/L (ref 3.7–5.3)
PROT SERPL-MCNC: 6.9 G/DL (ref 6.6–8.7)
PROTEIN, POC: NEGATIVE
RBC # BLD AUTO: 4.72 M/UL (ref 3.95–5.11)
SODIUM SERPL-SCNC: 139 MMOL/L (ref 136–145)
SPECIFIC GRAVITY, POC: 1.02
UROBILINOGEN, POC: 1
WBC OTHER # BLD: 8.7 K/UL (ref 3.5–11.3)

## 2024-01-17 PROCEDURE — 81002 URINALYSIS NONAUTO W/O SCOPE: CPT | Performed by: STUDENT IN AN ORGANIZED HEALTH CARE EDUCATION/TRAINING PROGRAM

## 2024-01-17 PROCEDURE — 77066 DX MAMMO INCL CAD BI: CPT

## 2024-01-17 PROCEDURE — 99212 OFFICE O/P EST SF 10 MIN: CPT | Performed by: STUDENT IN AN ORGANIZED HEALTH CARE EDUCATION/TRAINING PROGRAM

## 2024-01-17 PROCEDURE — 1123F ACP DISCUSS/DSCN MKR DOCD: CPT | Performed by: STUDENT IN AN ORGANIZED HEALTH CARE EDUCATION/TRAINING PROGRAM

## 2024-01-17 PROCEDURE — 3078F DIAST BP <80 MM HG: CPT | Performed by: STUDENT IN AN ORGANIZED HEALTH CARE EDUCATION/TRAINING PROGRAM

## 2024-01-17 PROCEDURE — 99214 OFFICE O/P EST MOD 30 MIN: CPT | Performed by: STUDENT IN AN ORGANIZED HEALTH CARE EDUCATION/TRAINING PROGRAM

## 2024-01-17 PROCEDURE — 3074F SYST BP LT 130 MM HG: CPT | Performed by: STUDENT IN AN ORGANIZED HEALTH CARE EDUCATION/TRAINING PROGRAM

## 2024-01-17 RX ORDER — SPIRONOLACTONE 25 MG/1
50 TABLET ORAL DAILY
COMMUNITY
Start: 2023-11-09 | End: 2024-01-17 | Stop reason: DRUGHIGH

## 2024-01-17 ASSESSMENT — PATIENT HEALTH QUESTIONNAIRE - PHQ9
SUM OF ALL RESPONSES TO PHQ QUESTIONS 1-9: 0
SUM OF ALL RESPONSES TO PHQ QUESTIONS 1-9: 0
SUM OF ALL RESPONSES TO PHQ9 QUESTIONS 1 & 2: 0
2. FEELING DOWN, DEPRESSED OR HOPELESS: 0
SUM OF ALL RESPONSES TO PHQ QUESTIONS 1-9: 0
SUM OF ALL RESPONSES TO PHQ QUESTIONS 1-9: 0
1. LITTLE INTEREST OR PLEASURE IN DOING THINGS: 0

## 2024-01-17 NOTE — PROGRESS NOTES
history, current medications, allergies, surgical history, family history and social history.  Updates were made as necessary.    Electronically signed by Ambrose Grimes MD on 1/25/2024 at 8:46 AM

## 2024-01-17 NOTE — PATIENT INSTRUCTIONS
Thank you for coming in today, Lucero.  We have ordered breast ultrasound and mammogram for you to have done today.  We have also ordered labs for you to have done today.  We will schedule you to see Dr. Gutierrez tomorrow morning to discuss results of these imaging studies.  Call or return sooner with any additional questions or concerns.

## 2024-01-18 ENCOUNTER — HOSPITAL ENCOUNTER (OUTPATIENT)
Dept: ULTRASOUND IMAGING | Age: 69
Discharge: HOME | End: 2024-01-20
Payer: COMMERCIAL

## 2024-01-18 ENCOUNTER — TELEPHONE (OUTPATIENT)
Age: 69
End: 2024-01-18

## 2024-01-18 PROCEDURE — 76642 ULTRASOUND BREAST LIMITED: CPT

## 2024-01-18 NOTE — TELEPHONE ENCOUNTER
Just an fyi - spoke to pt , mammogram results pending, us today at 3 pm. Rescheduled from me today to Dr. Horn when I am precepting. Spoke with patient, she does not want to find out results prior to that appointment

## 2024-01-19 ENCOUNTER — OFFICE VISIT (OUTPATIENT)
Age: 69
End: 2024-01-19
Payer: COMMERCIAL

## 2024-01-19 VITALS
HEART RATE: 57 BPM | DIASTOLIC BLOOD PRESSURE: 72 MMHG | TEMPERATURE: 97.4 F | SYSTOLIC BLOOD PRESSURE: 157 MMHG | BODY MASS INDEX: 39.22 KG/M2 | RESPIRATION RATE: 18 BRPM | WEIGHT: 235.7 LBS

## 2024-01-19 DIAGNOSIS — Z88.9 MULTIPLE ALLERGIES: ICD-10-CM

## 2024-01-19 DIAGNOSIS — Z85.528 HISTORY OF MALIGNANT NEOPLASM OF KIDNEY: ICD-10-CM

## 2024-01-19 DIAGNOSIS — Z79.4 TYPE 2 DIABETES MELLITUS WITHOUT COMPLICATION, WITH LONG-TERM CURRENT USE OF INSULIN (HCC): ICD-10-CM

## 2024-01-19 DIAGNOSIS — Z80.3 FAMILY HISTORY OF BREAST CANCER IN MOTHER: ICD-10-CM

## 2024-01-19 DIAGNOSIS — E11.9 TYPE 2 DIABETES MELLITUS WITHOUT COMPLICATION, WITH LONG-TERM CURRENT USE OF INSULIN (HCC): ICD-10-CM

## 2024-01-19 DIAGNOSIS — I10 ESSENTIAL HYPERTENSION: ICD-10-CM

## 2024-01-19 DIAGNOSIS — E66.01 CLASS 2 SEVERE OBESITY DUE TO EXCESS CALORIES WITH SERIOUS COMORBIDITY AND BODY MASS INDEX (BMI) OF 39.0 TO 39.9 IN ADULT (HCC): ICD-10-CM

## 2024-01-19 DIAGNOSIS — N64.52 PATHOLOGICAL NIPPLE DISCHARGE: Primary | ICD-10-CM

## 2024-01-19 DIAGNOSIS — Z78.9 NONSMOKER: ICD-10-CM

## 2024-01-19 DIAGNOSIS — Z90.5 ACQUIRED ABSENCE OF KIDNEY: ICD-10-CM

## 2024-01-19 PROCEDURE — 99214 OFFICE O/P EST MOD 30 MIN: CPT | Performed by: STUDENT IN AN ORGANIZED HEALTH CARE EDUCATION/TRAINING PROGRAM

## 2024-01-19 PROCEDURE — 1123F ACP DISCUSS/DSCN MKR DOCD: CPT | Performed by: STUDENT IN AN ORGANIZED HEALTH CARE EDUCATION/TRAINING PROGRAM

## 2024-01-19 PROCEDURE — 99213 OFFICE O/P EST LOW 20 MIN: CPT | Performed by: STUDENT IN AN ORGANIZED HEALTH CARE EDUCATION/TRAINING PROGRAM

## 2024-01-19 PROCEDURE — 3077F SYST BP >= 140 MM HG: CPT | Performed by: STUDENT IN AN ORGANIZED HEALTH CARE EDUCATION/TRAINING PROGRAM

## 2024-01-19 PROCEDURE — 3078F DIAST BP <80 MM HG: CPT | Performed by: STUDENT IN AN ORGANIZED HEALTH CARE EDUCATION/TRAINING PROGRAM

## 2024-01-19 RX ORDER — EPINEPHRINE 0.3 MG/.3ML
0.3 INJECTION SUBCUTANEOUS PRN
Qty: 2 EACH | Refills: 0 | Status: SHIPPED | OUTPATIENT
Start: 2024-01-19

## 2024-01-19 NOTE — PATIENT INSTRUCTIONS
Thank for coming in today, Lucero, it was great to see you again!    As discussed:  1.  I sent you a referral to the breast cancer specialist, Dr. Saldana.  You should hear from our office regarding scheduling follow-up.  Per guidelines, you are indicated for an MRI of the breast to further classify any changes to the ducts within your breast leading to the nipple.  2.  Please monitor for any changes to side effects or symptoms including return of nipple discharge, pain, discomfort, soreness, tenderness, skin changes, fever, chills, weight loss, increased fatigue, or any other concerning symptoms.  If these occur, please let us know.  If they are sudden or severe, please call 911 or go to the nearest emergency department for further evaluation.  3.  I sent a refill for EpiPens.  4.  I sent a referral to a genetic counselor to consider genetic testing given your family history of breast cancer.  5.  We would like to see you back in 4-6 weeks after you meet with Dr. Saldana.    Per the American Heart Association, strive for 150 minutes weekly of moderate level exercise such as walking, bicycling, or other tolerated physical activity that comfortable elevates your heart rate.     Please let me know if you have any questions or concerns. I would like to see back in 4-6 weeks.  Thank you.

## 2024-01-23 ENCOUNTER — TELEPHONE (OUTPATIENT)
Dept: SURGERY | Age: 69
End: 2024-01-23

## 2024-01-23 LAB
ESTIMATED AVERAGE GLUCOSE: NORMAL
HBA1C MFR BLD: 9.7 %

## 2024-01-28 PROBLEM — Z85.528 HISTORY OF MALIGNANT NEOPLASM OF KIDNEY: Status: ACTIVE | Noted: 2024-01-28

## 2024-01-28 PROBLEM — Z88.9 MULTIPLE ALLERGIES: Status: ACTIVE | Noted: 2024-01-28

## 2024-02-05 ENCOUNTER — OFFICE VISIT (OUTPATIENT)
Dept: SURGERY | Age: 69
End: 2024-02-05

## 2024-02-05 VITALS
BODY MASS INDEX: 39.78 KG/M2 | TEMPERATURE: 97 F | DIASTOLIC BLOOD PRESSURE: 75 MMHG | HEART RATE: 57 BPM | HEIGHT: 64 IN | SYSTOLIC BLOOD PRESSURE: 139 MMHG | WEIGHT: 233 LBS

## 2024-02-05 DIAGNOSIS — Z80.3 FAMILY HISTORY OF BREAST CANCER IN FIRST DEGREE RELATIVE: ICD-10-CM

## 2024-02-05 DIAGNOSIS — N64.52 BLOODY DISCHARGE FROM LEFT NIPPLE: Primary | ICD-10-CM

## 2024-02-05 RX ORDER — EMPAGLIFLOZIN 25 MG/1
25 TABLET, FILM COATED ORAL EVERY MORNING
COMMUNITY

## 2024-02-05 RX ORDER — ERYTHROMYCIN 20 MG/G
1 GEL TOPICAL
COMMUNITY
Start: 2023-05-26

## 2024-02-05 RX ORDER — PEN NEEDLE, DIABETIC 32GX 5/32"
NEEDLE, DISPOSABLE MISCELLANEOUS
COMMUNITY
Start: 2024-01-24

## 2024-02-05 RX ORDER — BLOOD SUGAR DIAGNOSTIC
STRIP MISCELLANEOUS
COMMUNITY
Start: 2024-01-23

## 2024-02-05 NOTE — PATIENT INSTRUCTIONS
You have a left bloody nipple discharge. You have an MRI pending. We will discuss surgical plans afterwards via phone conference.

## 2024-02-05 NOTE — PROGRESS NOTES
Patient's Name/Date of Birth: Lucero Rose / 1955 (68 y.o.)    Date: February 5, 2024    HPI: Pt is a 68 y.o. female who presents to [facility] for evaluation of left breast pain around the nipple.  She noticed that she had a black spot in her bra one evening that was very dark.  The breast was sore, hot, and very hard.  The symptoms all resolved spontaneously within about 1 week of presentation.  She states that now she has some residual sharp pain in the bilateral chest (she points to her axilla and lateral chest wall).  She had bilateral diagnostic mammogram which showed no mammographic or sonographic evidence of malignancy and no suspicious imaging to correlate with the left nipple discharge.  It was given a BI-RADS 2.  This mammogram and ultrasound was performed on January 17, 2024    Regarding habits, she admits to occasionally wearing wired bras.  She also admits to drinking iced tea all day and about a half a cup of coffee daily as far as caffeine goes.  She did start new medications including Ozempic.  She also has been prescribed Farxiga but she has not started it yet.    Her past breast history includes in 1999 having an irregular mass removed that showed some precancerous cells but the exact diagnosis is not known.    Her GYN history has her starting menarche at age 12, she is a G3, P4 (1 set of twins) her first child at age 19.  Menopause was in 1994.  She took hormone replacement therapy for about 6 months.    Family history has her mom having breast cancer diagnosed in her late 40s and she passed away at age 79.  A maternal aunt had ovarian cancer.      Past Medical History:   Diagnosis Date    Arthritis     Cancer (HCC) 10/31/2019    bladder mass    Current moderate episode of major depressive disorder, unspecified whether recurrent (HCC)     Diabetes mellitus (HCC)     type II    Diverticulitis     Egg allergy     Excessive daytime sleepiness     Generalized anxiety

## 2024-02-05 NOTE — PROGRESS NOTES
Review of Systems   Constitutional:  Positive for activity change, appetite change, chills and fatigue. Negative for diaphoresis, fever and unexpected weight change.   Respiratory:  Positive for apnea, cough, chest tightness and shortness of breath. Negative for wheezing and stridor.    Cardiovascular:  Positive for leg swelling. Negative for chest pain.   Gastrointestinal:  Positive for abdominal distention and abdominal pain. Negative for anal bleeding, blood in stool, constipation, diarrhea, nausea, rectal pain and vomiting.   Genitourinary:  Positive for frequency and urgency. Negative for difficulty urinating, enuresis, flank pain and hematuria.   Musculoskeletal:  Negative for back pain.   Skin:  Negative for color change and pallor.   Allergic/Immunologic: Negative for food allergies and immunocompromised state.   Neurological:  Positive for weakness and numbness. Negative for syncope, speech difficulty, light-headedness and headaches.   Hematological:  Negative for adenopathy. Does not bruise/bleed easily.   Psychiatric/Behavioral:  The patient is not nervous/anxious.

## 2024-02-09 ENCOUNTER — INITIAL CONSULT (OUTPATIENT)
Dept: ONCOLOGY | Age: 69
End: 2024-02-09
Payer: MEDICARE

## 2024-02-09 DIAGNOSIS — Z85.528 HISTORY OF MALIGNANT NEOPLASM OF KIDNEY: Primary | ICD-10-CM

## 2024-02-09 DIAGNOSIS — Z80.3 FAMILY HISTORY OF BREAST CANCER: ICD-10-CM

## 2024-02-09 PROCEDURE — 99999 PR OFFICE/OUTPT VISIT,PROCEDURE ONLY: CPT | Performed by: GENETIC COUNSELOR, MS

## 2024-02-09 PROCEDURE — 96040 PR MEDICAL GENETICS COUNSELING EACH 30 MINUTES: CPT | Performed by: GENETIC COUNSELOR, MS

## 2024-02-09 NOTE — PROGRESS NOTES
suggest an increased risk for ovarian cancer  3) Possible recommendations for prophylactic hysterectomy for results which suggest an increased risk for endometrial cancer  4) Increased colon cancer surveillance by colonoscopy screening every 1-2 years beginning by age 20-25y    Lastly, we discussed that the results of Ms. Rose's genetic testing may be beneficial in defining her risk for cancer as well as for her family members.     SUMMARY & PLAN  1) Ms. Rose meets the NCCN criteria for genetic testing based on having a first degree relative with breast cancer under age 50. Additionally, Ms. Rose has a personal history of left renal cancer diagnosed at age 44.      2) Genetic testing via a multi-gene panel was recommended and offered to Ms. Rose.     3) Ms. Rose elected to proceed with the CancerNext Expanded + RNA Insight Hereditary Cancer Gene Panel.     4) Ms. Rose is aware that she will receive a notification from the laboratory ([facility]) if the out of pocket cost for testing exceeds $100 (based on individual insurance plan) and the alternative option to proceed with the self pay price of $250.     5) Informed consent was obtained and a blood sample was sent to [facility]. We will call Ms. Rose with results as soon as they are available. A follow up appointment may be recommended.  A summary letter with results and final medical management recommendations will be sent once available.      A total of 30 minutes were spent face to face with Ms. Rose and 50% of the time was spent educating and counseling.     The [facility] Genetic Counseling Program would be glad to offer our assistance should you have any questions or concerns about this information. Please feel free to contact us at [phone].        Lucy Haque MS, [facility]   Licensed Genetic Counselor

## 2024-02-14 ENCOUNTER — TELEPHONE (OUTPATIENT)
Age: 69
End: 2024-02-14

## 2024-02-14 DIAGNOSIS — N64.52 PATHOLOGICAL NIPPLE DISCHARGE: Primary | ICD-10-CM

## 2024-02-14 NOTE — TELEPHONE ENCOUNTER
Per radiology procotol they need MRI left breast changed to MRI breast bilateral with and without contrast.    Patient told, Alejandra at [facility] scheduling, that she cannot have contrast.  Please advise.

## 2024-03-04 ENCOUNTER — TELEPHONE (OUTPATIENT)
Dept: ONCOLOGY | Age: 69
End: 2024-03-04

## 2024-03-04 NOTE — TELEPHONE ENCOUNTER
Left message for Lucero notifying her that her genetic test results are available. Requested that she return my phone call at her earliest convenience to review results.     
continue cancer screening guidelines as directed by her physicians.     RECOMMENDATIONS FOR FAMILY MEMBERS   1) Genetic testing is not recommended for Ms. Rose's children based on her negative test results. However, this recommendation does not take into consideration any family history of cancer in their paternal family.     2) It is possible that the cancers in Ms. Rose's family are due to a hereditary gene mutation that she did not inherit. Therefore, her relatives (particularly those with a current or previous cancer diagnosis) may consider genetic counseling and testing to clarify this possibility. Relatives may contact the [facility] Genetic Counseling Program at [phone] or locate a genetic counselor at [email or web address].     3) We encourage Ms. Rose's relatives to discuss their family history of cancer with their physicians to determine the most appropriate cancer screening recommendations. Ms. Rose's female relatives may benefit from a formal breast cancer risk assessment by the Radha or Tyrer Cuzick risk models to determine if additional breast cancer screening is warranted.     SUMMARY & PLAN   1) Ms. Rose's genetic test results are negative meaning there were no clinically significant mutations detected in the 71 genes analyzed.     2) There are no changes in medical management for Ms. Rose based on her negative genetic test results. She should complete her breast MRI on 3/6/24 and follow up with Dr. Saldana for results and future screening recommendations.     3) We encourage Ms. Rose to contact us every 1-2 years to determine if there are any new genetic testing or research options available.     4) We encourage Ms. Rose to contact us with updates to her personal and/or family’s cancer history as this information may alter our assessment and/or recommendations.     The [facility] Genetic Counseling Program would be glad to offer our assistance should you have

## 2024-03-05 DIAGNOSIS — I10 PRIMARY HYPERTENSION: ICD-10-CM

## 2024-03-05 DIAGNOSIS — E03.9 HYPOTHYROIDISM, UNSPECIFIED TYPE: ICD-10-CM

## 2024-03-05 RX ORDER — CARVEDILOL 25 MG/1
25 TABLET ORAL 2 TIMES DAILY WITH MEALS
Qty: 180 TABLET | Refills: 0 | Status: SHIPPED | OUTPATIENT
Start: 2024-03-05

## 2024-03-05 RX ORDER — LEVOTHYROXINE SODIUM 0.03 MG/1
25 TABLET ORAL EVERY MORNING
Qty: 90 TABLET | Refills: 0 | Status: SHIPPED | OUTPATIENT
Start: 2024-03-05

## 2024-03-05 RX ORDER — AMLODIPINE BESYLATE 5 MG/1
5 TABLET ORAL DAILY
Qty: 90 TABLET | Refills: 0 | Status: SHIPPED | OUTPATIENT
Start: 2024-03-05

## 2024-03-05 NOTE — PROGRESS NOTES
understanding. Medications, laboratory testing, imaging, consultation, and follow up as documented in this encounter.     Please be aware portions of this note were completed using voice recognition software and unforeseen errors may have occurred    Patient was seen and discussed with preceptor  Dr. Gutierrez  at the time of the encounter.       Electronically signed by Lauren Miller MD on 3/6/2024 at 12:55 PM

## 2024-03-06 ENCOUNTER — HOSPITAL ENCOUNTER (OUTPATIENT)
Dept: MRI IMAGING | Age: 69
Discharge: HOME | End: 2024-03-08
Payer: MEDICARE

## 2024-03-06 ENCOUNTER — HOSPITAL ENCOUNTER (OUTPATIENT)
Dept: GENERAL RADIOLOGY | Age: 69
Discharge: HOME | End: 2024-03-08
Payer: MEDICARE

## 2024-03-06 ENCOUNTER — HOSPITAL ENCOUNTER (OUTPATIENT)
Age: 69
Discharge: HOME | End: 2024-03-08
Payer: MEDICARE

## 2024-03-06 ENCOUNTER — OFFICE VISIT (OUTPATIENT)
Age: 69
End: 2024-03-06
Payer: MEDICARE

## 2024-03-06 VITALS
WEIGHT: 236.5 LBS | HEART RATE: 59 BPM | RESPIRATION RATE: 18 BRPM | SYSTOLIC BLOOD PRESSURE: 136 MMHG | BODY MASS INDEX: 40.6 KG/M2 | TEMPERATURE: 97.9 F | DIASTOLIC BLOOD PRESSURE: 55 MMHG

## 2024-03-06 DIAGNOSIS — M79.645 PAIN OF LEFT THUMB: ICD-10-CM

## 2024-03-06 DIAGNOSIS — R79.89 ELEVATED SERUM CREATININE: ICD-10-CM

## 2024-03-06 DIAGNOSIS — N64.52 PATHOLOGICAL NIPPLE DISCHARGE: ICD-10-CM

## 2024-03-06 DIAGNOSIS — N64.52 PATHOLOGICAL NIPPLE DISCHARGE: Primary | ICD-10-CM

## 2024-03-06 LAB
CREAT SERPL-MCNC: 1 MG/DL (ref 0.5–0.9)
GFR SERPL CREATININE-BSD FRML MDRD: >60 ML/MIN/1.73M2

## 2024-03-06 PROCEDURE — C8908 MRI W/O FOL W/CONT, BREAST,: HCPCS

## 2024-03-06 PROCEDURE — 73140 X-RAY EXAM OF FINGER(S): CPT

## 2024-03-06 PROCEDURE — 99214 OFFICE O/P EST MOD 30 MIN: CPT

## 2024-03-06 PROCEDURE — 99214 OFFICE O/P EST MOD 30 MIN: CPT | Performed by: STUDENT IN AN ORGANIZED HEALTH CARE EDUCATION/TRAINING PROGRAM

## 2024-03-06 PROCEDURE — 36415 COLL VENOUS BLD VENIPUNCTURE: CPT

## 2024-03-06 PROCEDURE — 82565 ASSAY OF CREATININE: CPT

## 2024-03-06 PROCEDURE — 6360000004 HC RX CONTRAST MEDICATION

## 2024-03-06 PROCEDURE — A9579 GAD-BASE MR CONTRAST NOS,1ML: HCPCS

## 2024-03-06 PROCEDURE — 2580000003 HC RX 258

## 2024-03-06 RX ORDER — SODIUM CHLORIDE 0.9 % (FLUSH) 0.9 %
10 SYRINGE (ML) INJECTION PRN
Status: DISCONTINUED | OUTPATIENT
Start: 2024-03-06 | End: 2024-03-09 | Stop reason: HOSPADM

## 2024-03-06 RX ORDER — 0.9 % SODIUM CHLORIDE 0.9 %
40 INTRAVENOUS SOLUTION INTRAVENOUS ONCE
Status: COMPLETED | OUTPATIENT
Start: 2024-03-06 | End: 2024-03-06

## 2024-03-06 RX ADMIN — SODIUM CHLORIDE 40 ML: 9 INJECTION, SOLUTION INTRAVENOUS at 18:03

## 2024-03-06 RX ADMIN — SODIUM CHLORIDE, PRESERVATIVE FREE 10 ML: 5 INJECTION INTRAVENOUS at 18:02

## 2024-03-06 RX ADMIN — GADOTERIDOL 20 ML: 279.3 INJECTION, SOLUTION INTRAVENOUS at 18:02

## 2024-03-06 ASSESSMENT — ENCOUNTER SYMPTOMS: SHORTNESS OF BREATH: 0

## 2024-03-06 NOTE — PATIENT INSTRUCTIONS
Thank you for following up with us at [facility] outpatient residency clinic! It was a pleasure to meet you today!     Our plan is the following:  - Please get your labs and imaging done. I will review the results when they become available.  - Please refer to the handout for joint pain in the meantime.    If you have any additional questions or concerns, please call the office ([phone]) and speak to one of the staff. They will triage and forward the message to the doctors! Have a great rest of your day!

## 2024-04-05 ENCOUNTER — TELEPHONE (OUTPATIENT)
Age: 69
End: 2024-04-05

## 2024-04-05 NOTE — TELEPHONE ENCOUNTER
Lauren Miller MD  [facility] Clinical Staff  Please call patient to go over message (patient did not read through Nolio). She does have F/U appointment already.          Previous Messages       ----- Message -----  From: Lauren Miller MD  Sent: 3/14/2024  To: Lcuero Rose  Subject: Unread Message Notification                      Dear Lucero,    Your recent xray of the left thumb did not show any acute fractures. It does show generalized osteopenia (loss of bone mineral density that can weaken bones) and mild degenerative change at the base of the thumb and tip of the thumb. Things you can try include exercises for thumb arthritis, Tylenol arthritis (this does not affect your kidneys but still make sure to drink plenty of water), using heat/ice, wearing an Oval-8 splint on the thumb daily. You can also consider a steroid injection in the joint if those other measures fail. If you would like me to refer you to a hand specialist that, too, is doable.    Please let me know if you have any questions.    Sincerely,  Dr. Miller

## 2024-04-05 NOTE — TELEPHONE ENCOUNTER
Called and spoken to patient to let her know info per Dr. Miller. Patient understood with the plan. Message completed. AAMIR

## 2024-04-08 NOTE — PROGRESS NOTES
[facility] Residency  [address] [place] [address]  Phone: [phone]  Fax: [phone]      Date of Visit:  4/10/2024  Patient Name: Lucero Rose   Patient :  1955     HPI:     Lucero Rose is a 68 y.o. female who presents today to discuss   Chief Complaint   Patient presents with    Hand Pain     Left thumb pain/ mainly the joint.  When she bends it, it feels funny and hurts really bad.     Results     xray    Positive For Covid-19     Had COVID/ pneumonia about a month ago. Went to urgent care for this.  Want to know if you can check if she still has \"pneumonia'.     Medication Check     Have not taken Livalo, state insurance is denying it. Want to know if Dr Miller received a notice from her insurance regarding this.   Also, did not start Jardiance yet.         This is a 68 year female with a history of T2DM, HTN, HLD, hypothyroidism, and acquired absence of right kidney who presents today for an follow up visit for left thumb pain and recent urgent care visit.    Patient states the pain at the base of her left thumb is still present and worse with movement but more tolerable since the last visit. No numbness or tingling. XR of her thumb show generalized osteopenia and mild degenerative changes to the 1st CMC and interphalangeal joint. No acute pathology. Patient does not use a brace or heat/ice. She does have some cream similar to voltaren that she occasionally applies. Given her diabetes, she is not interested in any steroid injections at this time.     In regards to her recent urgent care visit on 3/13/24, she presented with chills, cough, shortness of breath, fatigue, congestion and headache found to be COVID19 positive. Labs significant for slightly elevated potassium and elevated glucose. Chest XR showed left retrocardiac opacity possibly developing atelectasis or pneumonia. She was given 10 units of insulin and prescribed

## 2024-04-10 ENCOUNTER — OFFICE VISIT (OUTPATIENT)
Age: 69
End: 2024-04-10
Payer: MEDICARE

## 2024-04-10 VITALS
RESPIRATION RATE: 18 BRPM | WEIGHT: 235.4 LBS | BODY MASS INDEX: 40.41 KG/M2 | TEMPERATURE: 97.8 F | DIASTOLIC BLOOD PRESSURE: 59 MMHG | SYSTOLIC BLOOD PRESSURE: 130 MMHG | HEART RATE: 60 BPM

## 2024-04-10 DIAGNOSIS — M19.032 CMC DJD(CARPOMETACARPAL DEGENERATIVE JOINT DISEASE), LOCALIZED PRIMARY, LEFT: Primary | ICD-10-CM

## 2024-04-10 DIAGNOSIS — N64.52 PATHOLOGICAL NIPPLE DISCHARGE: ICD-10-CM

## 2024-04-10 DIAGNOSIS — E78.5 HYPERLIPIDEMIA, UNSPECIFIED HYPERLIPIDEMIA TYPE: ICD-10-CM

## 2024-04-10 DIAGNOSIS — Z86.16 HISTORY OF COVID-19: ICD-10-CM

## 2024-04-10 PROCEDURE — 3078F DIAST BP <80 MM HG: CPT

## 2024-04-10 PROCEDURE — 1123F ACP DISCUSS/DSCN MKR DOCD: CPT

## 2024-04-10 PROCEDURE — 3075F SYST BP GE 130 - 139MM HG: CPT

## 2024-04-10 PROCEDURE — 99213 OFFICE O/P EST LOW 20 MIN: CPT

## 2024-04-10 PROCEDURE — 99214 OFFICE O/P EST MOD 30 MIN: CPT

## 2024-04-10 RX ORDER — INSULIN GLARGINE 100 [IU]/ML
INJECTION, SOLUTION SUBCUTANEOUS
COMMUNITY
Start: 2024-02-27 | End: 2024-04-10

## 2024-04-10 RX ORDER — PITAVASTATIN CALCIUM 4.18 MG/1
4 TABLET, FILM COATED ORAL NIGHTLY
Qty: 90 TABLET | Refills: 0 | Status: SHIPPED | OUTPATIENT
Start: 2024-04-10

## 2024-04-10 NOTE — PATIENT INSTRUCTIONS
Thank you for following up with us at [facility] outpatient residency clinic! It was a pleasure to meet you today!     Our plan is the following:  - Your recent xray of the left thumb did not show any acute fractures. It does show generalized osteopenia (loss of bone mineral density that can weaken bones) and mild degenerative change at the base of the thumb and tip of the thumb.   - Things you can try include Tylenol arthritis (this does not affect your kidneys but still make sure to drink plenty of water), using heat/ice, wear a CMC brace on the thumb. You can also consider a steroid injection in the joint if those other measures fail. If you would like me to refer you to a hand specialist that, too, is doable.   - For your congestion, please take Zyrtec and Flonase. If Afrin is used daily it could cause worsening or rebound congestion. Do not use for more than three consecutive days. Follow up with ENT if symptoms worsen or fail to improve.  - If you have wheezing or difficulty breathing we can always order a follow up chest xray in a month to see how you are doing   - Please call Dr. Saldana to discuss your recent MRI breast.    If you have any additional questions or concerns, please call the office ([phone]) and speak to one of the staff. They will triage and forward the message to the doctors! Have a great rest of your day!

## 2024-04-11 DIAGNOSIS — N64.52 BLOODY DISCHARGE FROM LEFT NIPPLE: Primary | ICD-10-CM

## 2024-04-11 NOTE — PROGRESS NOTES
Discussed the MRI results with the patient for the left nipple discharge and the MRI was negative. She is not having discharge now. We will have her follow-up after a repeat ultrasound in 6 months and exam.

## 2024-04-14 PROBLEM — M19.032 CMC DJD(CARPOMETACARPAL DEGENERATIVE JOINT DISEASE), LOCALIZED PRIMARY, LEFT: Status: ACTIVE | Noted: 2024-04-14

## 2024-05-10 ENCOUNTER — OFFICE VISIT (OUTPATIENT)
Age: 69
End: 2024-05-10
Payer: MEDICARE

## 2024-05-10 VITALS
WEIGHT: 236 LBS | DIASTOLIC BLOOD PRESSURE: 63 MMHG | SYSTOLIC BLOOD PRESSURE: 125 MMHG | RESPIRATION RATE: 16 BRPM | BODY MASS INDEX: 40.29 KG/M2 | TEMPERATURE: 97.6 F | HEART RATE: 61 BPM | HEIGHT: 64 IN

## 2024-05-10 DIAGNOSIS — M79.641 PAIN IN RIGHT HAND: Primary | ICD-10-CM

## 2024-05-10 DIAGNOSIS — Z90.5 ACQUIRED ABSENCE OF KIDNEY: ICD-10-CM

## 2024-05-10 PROCEDURE — 3074F SYST BP LT 130 MM HG: CPT | Performed by: FAMILY MEDICINE

## 2024-05-10 PROCEDURE — 3078F DIAST BP <80 MM HG: CPT | Performed by: FAMILY MEDICINE

## 2024-05-10 PROCEDURE — 1123F ACP DISCUSS/DSCN MKR DOCD: CPT | Performed by: FAMILY MEDICINE

## 2024-05-10 PROCEDURE — 99213 OFFICE O/P EST LOW 20 MIN: CPT | Performed by: FAMILY MEDICINE

## 2024-05-10 NOTE — PROGRESS NOTES
[facility] Res[facility]  [address] [place] [address]  Phone: [phone]  Fax: [phone]      Date of Visit: 5/10/24  Patient Name: Lucero Rose   Patient :  1955     ASSESSMENT/PLAN   1. Pain in right hand  Assessment & Plan:  - pain lasting approx. 1 week, has tried no OTC treatments  - pt has desk job, and examination indicates carpal tunnel vs overuse injury possible  - pain worse in the evening, so discussed with pt using brace during the day, taking it easy with R hand use, and icing throughout the day.  - acquired absence of a kidney, so no PO NSAIDs, but pt okay to use Voltaren gel. Discussed qid application, and PO Tylenol for pain.   - Plan to reevaluate in 7 to 10 days  2. Acquired absence of kidney       Return in about 1 week (around 2024) for follow-up of right hand/palm pain.    HPI    Lucero Rose is a 68 y.o. female who presents today to discuss   Chief Complaint   Patient presents with    right hand pain     A couple of weeks per patient. Has not been taking anything for the pain. Rate pain today 3.      Patient presents with right hand pain lasting for approximately 1 week.  Reports pain is constant, characterized as aching, 3 on 0-10 pain scale (0= no pain, 10= worst pain ever).  Experiences pain across the palm, but reports that with flexion/forming a wrist, shooting pain will extend up through DIP of middle finger.  No pain in wrist or up in arm.  Denies injury.  Patient works at a computer working on taxes and payroll, is right-handed.  Has tried using ice and heat on the area and slept with wrist brace (limits thumb movement, pt reports brace extends up to PIP joints) last night, but noticed no improvement.  Notes pain is worse towards the end of the day.  Also reporting weakened  strength, difficulty with writing.  Has not tried any OTC pain medication.    I personally reviewed the patient's past

## 2024-05-10 NOTE — ASSESSMENT & PLAN NOTE
- pain lasting approx. 1 week, has tried no OTC treatments  - pt has desk job, and examination indicates carpal tunnel vs overuse injury possible  - pain worse in the evening, so discussed with pt using brace during the day, taking it easy with R hand use, and icing throughout the day.  - acquired absence of a kidney, so no PO NSAIDs, but pt okay to use Voltaren gel. Discussed qid application, and PO Tylenol for pain.   - Plan to reevaluate in 7 to 10 days

## 2024-05-10 NOTE — PATIENT INSTRUCTIONS
Thank you for following up with us at [facility] Medicine office! It was a pleasure to meet you today!     Our plan is the following:  - I want you to wear the brace that goes up to the middle of your fingers during the day. You can use Voltaren gel (you'll have to apply it around 4x a day) to help with local inflammation.    - Take Tylenol for pain control, and I want you to try icing your hand 3-4 times a day to help with inflammation as well    - I or your PCP will see you in 7-10 days to see how your hand pain is doing. If the pain gets worse, I want you to let me know.    Since tax season has passed, I want you to try and take it easy.     Your medication list is included in the after visit summary. If you find any differences when compared to your medications at home, please contact the office ([phone]) to let us know.   You can also call the office if you have any additional questions or concerns and speak to one of the staff. They will triage and forward the message to the provider.   Have a great rest of your day!

## 2024-05-13 ENCOUNTER — OFFICE VISIT (OUTPATIENT)
Dept: SURGERY | Age: 69
End: 2024-05-13
Payer: MEDICARE

## 2024-05-13 VITALS
OXYGEN SATURATION: 94 % | RESPIRATION RATE: 20 BRPM | BODY MASS INDEX: 39.95 KG/M2 | DIASTOLIC BLOOD PRESSURE: 59 MMHG | SYSTOLIC BLOOD PRESSURE: 120 MMHG | HEART RATE: 68 BPM | HEIGHT: 64 IN | WEIGHT: 234 LBS

## 2024-05-13 DIAGNOSIS — N64.52 BLOODY DISCHARGE FROM LEFT NIPPLE: Primary | ICD-10-CM

## 2024-05-13 PROCEDURE — 1123F ACP DISCUSS/DSCN MKR DOCD: CPT | Performed by: SURGERY

## 2024-05-13 PROCEDURE — 3078F DIAST BP <80 MM HG: CPT | Performed by: SURGERY

## 2024-05-13 PROCEDURE — 3074F SYST BP LT 130 MM HG: CPT | Performed by: SURGERY

## 2024-05-13 PROCEDURE — 99213 OFFICE O/P EST LOW 20 MIN: CPT | Performed by: SURGERY

## 2024-05-13 NOTE — PATIENT INSTRUCTIONS
The left bloody nipple discharge has returned. We will schedule you for a left breast central duct excision at [facility] on a 4th Tuesday.  Down time will be about 3 days.

## 2024-05-13 NOTE — PROGRESS NOTES
Review of Systems   Constitutional:  Positive for activity change and fatigue. Negative for appetite change, chills, diaphoresis, fever and unexpected weight change.   Respiratory:  Positive for cough and shortness of breath. Negative for apnea, chest tightness, wheezing and stridor.    Cardiovascular:  Positive for leg swelling. Negative for chest pain.   Gastrointestinal:  Negative for abdominal distention, abdominal pain, anal bleeding, blood in stool, constipation, diarrhea, nausea, rectal pain and vomiting.   Genitourinary:  Negative for difficulty urinating, dysuria, enuresis, flank pain, frequency, hematuria and urgency.   Musculoskeletal:  Negative for back pain.   Skin:  Negative for color change and pallor.   Allergic/Immunologic: Negative for food allergies and immunocompromised state.   Neurological:  Negative for syncope, speech difficulty, weakness, light-headedness, numbness and headaches.   Hematological:  Negative for adenopathy. Does not bruise/bleed easily.   Psychiatric/Behavioral:  The patient is not nervous/anxious.       
03/06/2024    Narrative  EXAMINATION:  MRI OF THE BILATERAL BREASTS WITHOUT AND WITH CONTRAST 3/6/2024 6:00 pm:    TECHNIQUE:  Multiplanar multisequence MRI of the bilateral breasts were performed without  and with the administration of intravenous contrast.    Data analysis was performed with color parametric mapping, image subtraction,  and 3D reconstructions.    COMPARISON:  No prior MRI study for comparison.  Prior mammograms and ultrasounds most  recent dated 01/18/2024.    HISTORY:  ORDERING SYSTEM PROVIDED HISTORY: Pathological nipple discharge  TECHNOLOGIST PROVIDED HISTORY:  STAT Creatinine as needed:->Yes  Pathologic bloody nipple discharge s/p negative mammogram and focused US    FINDINGS:  Breast parenchyma: Scattered fibroglandular density.    Moderate background parenchymal enhancement symmetrical.    Right breast: Several enhancing dilated ducts in the retroareolar right  breast without evidence intraluminal mass.  Scattered T2 bright enhancing  foci consistent with benign background parenchymal enhancement.  No evidence  of abnormal enhancing mass or non mass enhancement.  The nipple/areolar  complex and chest wall unremarkable.  No evidence of lymphadenopathy.    Left breast: There are several enhancing focally dilated ducts in the left  breast extending from the nipple posteriorly.  No discrete intraluminal mass  can be identified.  The extent of the dilated duct measures 2.7 cm.  This is  symmetrical to the contralateral right side.  Elsewhere in the left breast,  scattered T2 bright enhancing foci consistent with benign background  parenchymal enhancement. The chest wall unremarkable. No evidence of  lymphadenopathy.    Impression  1.  Several enhancing dilated ducts in the retroareolar left breast extending  3.7 cm posteriorly from the nipple without evidence of discrete intraluminal  mass.  This finding symmetrical to the contralateral right side and  correlates with the prior ultrasound study

## 2024-05-28 ENCOUNTER — TELEPHONE (OUTPATIENT)
Dept: SURGERY | Age: 69
End: 2024-05-28

## 2024-05-28 NOTE — TELEPHONE ENCOUNTER
Patient calling looking for information regarding a surgery that needs to be scheduled with Dr. Saldana please call patient at [phone] [facility]/sc

## 2024-05-29 NOTE — TELEPHONE ENCOUNTER
I spoke with patient to schedule surgery at [facility]. Info sent thru mychart.    Surg appt 6/25 @ 8:00  Arrival time 6:00  PAT  6/11 @ 9:30  Post op 7/16 @ 9:45

## 2024-05-31 DIAGNOSIS — E03.9 HYPOTHYROIDISM, UNSPECIFIED TYPE: ICD-10-CM

## 2024-05-31 RX ORDER — LEVOTHYROXINE SODIUM 0.03 MG/1
25 TABLET ORAL EVERY MORNING
Qty: 90 TABLET | Refills: 0 | Status: SHIPPED | OUTPATIENT
Start: 2024-05-31

## 2024-06-10 DIAGNOSIS — I10 PRIMARY HYPERTENSION: ICD-10-CM

## 2024-06-10 RX ORDER — AMLODIPINE BESYLATE 5 MG/1
5 TABLET ORAL DAILY
Qty: 90 TABLET | Refills: 0 | Status: SHIPPED | OUTPATIENT
Start: 2024-06-10

## 2024-06-10 RX ORDER — CARVEDILOL 25 MG/1
25 TABLET ORAL 2 TIMES DAILY WITH MEALS
Qty: 180 TABLET | Refills: 0 | Status: SHIPPED | OUTPATIENT
Start: 2024-06-10

## 2024-06-11 ENCOUNTER — HOSPITAL ENCOUNTER (OUTPATIENT)
Dept: PREADMISSION TESTING | Age: 69
Discharge: HOME | End: 2024-06-11

## 2024-06-11 NOTE — H&P
Systems    GENERAL PHYSICAL EXAM     Vitals: There were no vitals taken for this visit.              Physical Exam    LAB REVIEW         PRELIMINARY EKG REVIEW, DATE:        SURGERY / PROVISIONAL DIAGNOSES:      BREAST CENTRAL DUCT EXCISION, LEFT    Bloody discharge from nipple [N64.52]    Patient Active Problem List    Diagnosis Date Noted    Pain in right hand 05/10/2024    CMC DJD(carpometacarpal degenerative joint disease), localized primary, left 04/14/2024    Multiple allergies 01/28/2024    History of malignant neoplasm of kidney 01/28/2024    Acquired absence of kidney 01/17/2024    Class 3 severe obesity due to excess calories without serious comorbidity with body mass index (BMI) of 40.0 to 44.9 in adult ([facility]) 09/24/2023    Nonsmoker 09/22/2023    Type 2 diabetes mellitus without complication, with long-term current use of insulin ([facility]) 08/16/2023    Diverticulitis 08/16/2023    Gastroesophageal reflux disease 08/16/2023    Hypercholesterolemia 08/16/2023    Malignant neoplasm ([facility]) 08/16/2023    Other specified hypothyroidism 08/16/2023    Essential hypertension 01/05/2023           CLEARANCE:     Total time spent on encounter- {PAT provider minutes:46205}     NICOLÁS Shields CNP on 6/11/2024 at 7:42 AM

## 2024-06-14 ENCOUNTER — HOSPITAL ENCOUNTER (OUTPATIENT)
Dept: PREADMISSION TESTING | Age: 69
End: 2024-06-14
Payer: MEDICARE

## 2024-06-14 VITALS
HEART RATE: 57 BPM | RESPIRATION RATE: 18 BRPM | TEMPERATURE: 97.2 F | HEIGHT: 65 IN | SYSTOLIC BLOOD PRESSURE: 147 MMHG | WEIGHT: 230 LBS | BODY MASS INDEX: 38.32 KG/M2 | DIASTOLIC BLOOD PRESSURE: 62 MMHG | OXYGEN SATURATION: 96 %

## 2024-06-14 LAB
ANION GAP SERPL CALCULATED.3IONS-SCNC: 13 MMOL/L (ref 9–17)
BASOPHILS # BLD: 0 K/UL (ref 0–0.2)
BASOPHILS NFR BLD: 1 % (ref 0–2)
BUN SERPL-MCNC: 19 MG/DL (ref 8–23)
CALCIUM SERPL-MCNC: 9.3 MG/DL (ref 8.6–10.4)
CHLORIDE SERPL-SCNC: 103 MMOL/L (ref 98–107)
CO2 SERPL-SCNC: 20 MMOL/L (ref 20–31)
CREAT SERPL-MCNC: 0.9 MG/DL (ref 0.5–0.9)
EOSINOPHIL # BLD: 0.1 K/UL (ref 0–0.4)
EOSINOPHILS RELATIVE PERCENT: 2 % (ref 0–4)
ERYTHROCYTE [DISTWIDTH] IN BLOOD BY AUTOMATED COUNT: 13.5 % (ref 11.5–14.9)
GFR, ESTIMATED: 70 ML/MIN/1.73M2
GLUCOSE SERPL-MCNC: 185 MG/DL (ref 70–99)
HCT VFR BLD AUTO: 42.9 % (ref 36–46)
HGB BLD-MCNC: 14 G/DL (ref 12–16)
LYMPHOCYTES NFR BLD: 1.9 K/UL (ref 1–4.8)
LYMPHOCYTES RELATIVE PERCENT: 24 % (ref 24–44)
MCH RBC QN AUTO: 31.3 PG (ref 26–34)
MCHC RBC AUTO-ENTMCNC: 32.6 G/DL (ref 31–37)
MCV RBC AUTO: 96 FL (ref 80–100)
MONOCYTES NFR BLD: 0.5 K/UL (ref 0.1–1.3)
MONOCYTES NFR BLD: 6 % (ref 1–7)
NEUTROPHILS NFR BLD: 67 % (ref 36–66)
NEUTS SEG NFR BLD: 5.3 K/UL (ref 1.3–9.1)
PLATELET # BLD AUTO: 212 K/UL (ref 150–450)
PMV BLD AUTO: 7.5 FL (ref 6–12)
POTASSIUM SERPL-SCNC: 4.6 MMOL/L (ref 3.7–5.3)
RBC # BLD AUTO: 4.47 M/UL (ref 4–5.2)
SODIUM SERPL-SCNC: 136 MMOL/L (ref 135–144)
WBC OTHER # BLD: 7.9 K/UL (ref 3.5–11)

## 2024-06-14 PROCEDURE — 36415 COLL VENOUS BLD VENIPUNCTURE: CPT

## 2024-06-14 PROCEDURE — 85025 COMPLETE CBC W/AUTO DIFF WBC: CPT

## 2024-06-14 PROCEDURE — APPSS45 APP SPLIT SHARED TIME 31-45 MINUTES: Performed by: NURSE PRACTITIONER

## 2024-06-14 PROCEDURE — 80048 BASIC METABOLIC PNL TOTAL CA: CPT

## 2024-06-14 ASSESSMENT — ENCOUNTER SYMPTOMS
CONSTIPATION: 0
TROUBLE SWALLOWING: 0
ABDOMINAL PAIN: 0
SHORTNESS OF BREATH: 1
GASTROINTESTINAL NEGATIVE: 1
COUGH: 1
VOMITING: 0
SORE THROAT: 0
NAUSEA: 0
APNEA: 1
DIARRHEA: 0
BACK PAIN: 1

## 2024-06-14 NOTE — DISCHARGE INSTRUCTIONS
Pre-op Instructions For Out-Patient Surgery    Medication Instructions:  Please stop herbs and any supplements now (includes vitamins and minerals).    Please contact your surgeon and prescribing physician for pre-op instructions for any blood thinners.    If you have inhalers/aerosol treatments at home, please use them the morning of your surgery and bring the inhalers with you to the hospital.    Please take the following medications the morning of your surgery with a sip of water:    carvedilol, amlodipine, levothyroxine     Surgery Instructions:  After midnight before surgery:  Do not eat or drink anything, including water, mints, gum, and hard candy.  You may brush your teeth without swallowing.  No smoking, chewing tobacco, or street drugs.    Please shower or bathe before surgery.  If you were given Surgical Scrub Chlorhexidine Gluconate Liquid (CHG), please shower the night before and the morning of your surgery following the detailed instructions you received during your pre-admission visit.     Please do not wear any cologne, lotion, powder, deodorant, jewelry, piercings, perfume, makeup, nail polish, hair accessories, or hair spray on the day of surgery.  Wear loose comfortable clothing.    Leave your valuables at home but bring a payment source for any after-surgery prescriptions you plan to fill at [facility].  Bring a storage case for any glasses/contacts.    An adult who is responsible for you MUST drive you home and should be with you for the first 24 hours after surgery.     If having out-patient knee and foot surgeries, please arrange for planned crutches, walker, or wheelchair before arriving to the hospital.    The Day of Surgery:  Arrive at [facility] Surgery Entrance at the time directed by your surgeon and check in at the desk.     If you have a living will or healthcare power of , please bring a copy.    You will be taken to the

## 2024-06-14 NOTE — H&P (VIEW-ONLY)
HISTORY and PHYSICAL  [facility]       NAME:  Lucero Rose  MRN: [id number]   YOB: 1955   Date: 6/14/2024   Age: 68 y.o.  Gender: female     COMPLAINT AND PRESENT HISTORY:   Lucero Rose is 68 y.o.,  female, presents for pre-anesthesia/admission testing for BREAST CENTRAL DUCT EXCISION, LEFT per Dr. Saldana.  Primary dx: Bloody discharge from nipple [N64.52].    SEE PORTION OF THE NOTE BELOW PER DR SALDANA FROM OFFICE NOTE ON 5/13/2024   Pt is a 68 y.o. female who presents to [facility] for evaluation of left bloody nipple discharge.  She states it has been going on for about 10 days or so.  Previously, she was seen on February 5 and at that time an MRI was being ordered to help with surgical planning.  She was complaining of some pain however that she had in the breast.  The MRI was completed on March 7, 2024 and it had the following impression:  1.  Several enhancing dilated ducts in the retroareolar left breast extending  3.7 cm posteriorly from the nipple without evidence of discrete intraluminal  mass.  This finding symmetrical to the contralateral right side and  correlates with the prior ultrasound study from January 2024.  Given the lack  of detectable intraluminal mass by MRI and ultrasound interrogation; further  evaluation with ductogram may be of benefit if clinically warranted.   2.  No MRI evidence of malignancy in the right breast.     HPI UPDATE 6/14/2024  Lucero Rose is 68 y.o.,  female, here for left bloody nipple discharge.  The symptoms started 1 month ago but no longer experiencing the left bloody nipple discharge.  Pt C/O of pain at that time but is no longer experiencing pain   No recent falls or trauma. No redness, swelling or rashes.  Pt denies any other symptoms.     Denies fever.  Reports chronic chills since COVID in 2020.  Denies recent or current chest pain/pressure, palpitations, headaches, dizziness.  Reports chronic lower

## 2024-06-14 NOTE — H&P
(CYANOCOBALAMIN) 1000 MCG tablet take 1 tablet by mouth once daily 90 tablet 3    insulin glargine (LANTUS) 100 UNIT/ML injection vial Inject 90 Units into the skin 2 times daily       No current facility-administered medications on file prior to encounter.       Review of Systems   Constitutional:  Negative for appetite change, chills, fever and unexpected weight change.   HENT: Negative.  Negative for dental problem, hearing loss, sore throat and trouble swallowing.    Eyes:  Positive for visual disturbance (wears glasses).   Respiratory:  Positive for apnea (not using machine), cough and shortness of breath.    Cardiovascular:  Positive for leg swelling. Negative for chest pain and palpitations.   Gastrointestinal: Negative.  Negative for abdominal pain, constipation, diarrhea, nausea and vomiting.   Genitourinary:  Negative for dysuria and hematuria.   Musculoskeletal:  Positive for arthralgias and back pain (chronic lower back with walking). Negative for neck pain.   Skin: Negative.    Neurological:  Positive for weakness (weakness bilateral legs). Negative for dizziness, seizures, speech difficulty, numbness and headaches.   Hematological:  Bruises/bleeds easily.   Psychiatric/Behavioral: Negative.         GENERAL PHYSICAL EXAM     Vitals: BP (!) 147/62   Pulse 57   Temp 97.2 °F (36.2 °C) (Infrared)   Resp 18   Ht 1.651 m (5' 5\")   Wt 104.3 kg (230 lb)   SpO2 96%   BMI 38.27 kg/m²               Physical Exam  Constitutional:       General: She is not in acute distress.     Appearance: Normal appearance.   HENT:      Head: Normocephalic.      Right Ear: External ear normal.      Left Ear: External ear normal.      Nose: Nose normal.      Mouth/Throat:      Mouth: Mucous membranes are dry.      Pharynx: Oropharynx is clear.   Eyes:      General:         Right eye: No discharge.         Left eye: No discharge.   Cardiovascular:      Rate and Rhythm: Normal rate and regular rhythm.      Heart sounds: Normal  heart sounds. No murmur heard.  Pulmonary:      Effort: Pulmonary effort is normal. No respiratory distress.      Breath sounds: Normal breath sounds. No wheezing, rhonchi or rales.   Abdominal:      General: Bowel sounds are normal. There is no distension.      Tenderness: There is no abdominal tenderness. There is no guarding.   Musculoskeletal:         General: No swelling.      Cervical back: Normal range of motion.      Right lower leg: No edema.      Left lower leg: No edema.   Skin:     General: Skin is dry.   Neurological:      Mental Status: She is alert and oriented to person, place, and time.   Psychiatric:         Mood and Affect: Mood normal.         Behavior: Behavior normal.         LAB REVIEW     Lab Results   Component Value Date    WBC 7.9 06/14/2024    HGB 14.0 06/14/2024    HCT 42.9 06/14/2024    MCV 96.0 06/14/2024     06/14/2024    LYMPHOPCT 24 06/14/2024    RBC 4.47 06/14/2024    MCH 31.3 06/14/2024    MCHC 32.6 06/14/2024    RDW 13.5 06/14/2024     Lab Results   Component Value Date/Time     06/14/2024 10:00 AM    K 4.6 06/14/2024 10:00 AM     06/14/2024 10:00 AM    CO2 20 06/14/2024 10:00 AM    BUN 19 06/14/2024 10:00 AM    CREATININE 0.9 06/14/2024 10:00 AM    GLUCOSE 185 06/14/2024 10:00 AM    GLUCOSE 271 10/05/2021 11:16 AM    CALCIUM 9.3 06/14/2024 10:00 AM    LABGLOM 70 06/14/2024 10:00 AM    LABGLOM >60 03/06/2024 06:10 PM       PRELIMINARY EKG REVIEW, DATE:    3/13/2024  Sinus bradycardia (HR 57  Otherwise normal ECG  SURGERY / PROVISIONAL DIAGNOSES:      BREAST CENTRAL DUCT EXCISION, LEFT    Bloody discharge from nipple [N64.52]    Patient Active Problem List    Diagnosis Date Noted    Pain in right hand 05/10/2024    CMC DJD(carpometacarpal degenerative joint disease), localized primary, left 04/14/2024    Multiple allergies 01/28/2024    History of malignant neoplasm of kidney 01/28/2024    Acquired absence of kidney 01/17/2024    Class 3 severe obesity due to

## 2024-06-24 ENCOUNTER — ANESTHESIA EVENT (OUTPATIENT)
Dept: OPERATING ROOM | Age: 69
End: 2024-06-24
Payer: MEDICARE

## 2024-06-25 ENCOUNTER — HOSPITAL ENCOUNTER (OUTPATIENT)
Age: 69
Setting detail: OUTPATIENT SURGERY
Discharge: HOME | End: 2024-06-25
Attending: SURGERY | Admitting: SURGERY
Payer: MEDICARE

## 2024-06-25 ENCOUNTER — ANESTHESIA (OUTPATIENT)
Dept: OPERATING ROOM | Age: 69
End: 2024-06-25
Payer: MEDICARE

## 2024-06-25 VITALS
BODY MASS INDEX: 38.32 KG/M2 | OXYGEN SATURATION: 94 % | HEART RATE: 68 BPM | SYSTOLIC BLOOD PRESSURE: 153 MMHG | TEMPERATURE: 96.6 F | HEIGHT: 65 IN | DIASTOLIC BLOOD PRESSURE: 74 MMHG | WEIGHT: 230 LBS | RESPIRATION RATE: 16 BRPM

## 2024-06-25 DIAGNOSIS — N64.52 BLOODY DISCHARGE FROM NIPPLE: ICD-10-CM

## 2024-06-25 LAB
GLUCOSE BLD-MCNC: 232 MG/DL (ref 65–105)
GLUCOSE BLD-MCNC: 253 MG/DL (ref 65–105)

## 2024-06-25 PROCEDURE — 6360000002 HC RX W HCPCS: Performed by: NURSE ANESTHETIST, CERTIFIED REGISTERED

## 2024-06-25 PROCEDURE — 2580000003 HC RX 258: Performed by: ANESTHESIOLOGY

## 2024-06-25 PROCEDURE — 82947 ASSAY GLUCOSE BLOOD QUANT: CPT

## 2024-06-25 PROCEDURE — 3700000000 HC ANESTHESIA ATTENDED CARE: Performed by: SURGERY

## 2024-06-25 PROCEDURE — 7100000031 HC ASPR PHASE II RECOVERY - ADDTL 15 MIN: Performed by: SURGERY

## 2024-06-25 PROCEDURE — 7100000010 HC PHASE II RECOVERY - FIRST 15 MIN: Performed by: SURGERY

## 2024-06-25 PROCEDURE — 7100000030 HC ASPR PHASE II RECOVERY - FIRST 15 MIN: Performed by: SURGERY

## 2024-06-25 PROCEDURE — 6370000000 HC RX 637 (ALT 250 FOR IP): Performed by: ANESTHESIOLOGY

## 2024-06-25 PROCEDURE — 6360000002 HC RX W HCPCS: Performed by: SURGERY

## 2024-06-25 PROCEDURE — 7100000000 HC PACU RECOVERY - FIRST 15 MIN: Performed by: SURGERY

## 2024-06-25 PROCEDURE — 3600000002 HC SURGERY LEVEL 2 BASE: Performed by: SURGERY

## 2024-06-25 PROCEDURE — 88307 TISSUE EXAM BY PATHOLOGIST: CPT

## 2024-06-25 PROCEDURE — 3600000012 HC SURGERY LEVEL 2 ADDTL 15MIN: Performed by: SURGERY

## 2024-06-25 PROCEDURE — 88342 IMHCHEM/IMCYTCHM 1ST ANTB: CPT

## 2024-06-25 PROCEDURE — 88341 IMHCHEM/IMCYTCHM EA ADD ANTB: CPT

## 2024-06-25 PROCEDURE — 2709999900 HC NON-CHARGEABLE SUPPLY: Performed by: SURGERY

## 2024-06-25 PROCEDURE — 2500000003 HC RX 250 WO HCPCS: Performed by: SURGERY

## 2024-06-25 PROCEDURE — 2500000003 HC RX 250 WO HCPCS: Performed by: NURSE ANESTHETIST, CERTIFIED REGISTERED

## 2024-06-25 PROCEDURE — 7100000001 HC PACU RECOVERY - ADDTL 15 MIN: Performed by: SURGERY

## 2024-06-25 PROCEDURE — 7100000011 HC PHASE II RECOVERY - ADDTL 15 MIN: Performed by: SURGERY

## 2024-06-25 PROCEDURE — 19120 REMOVAL OF BREAST LESION: CPT | Performed by: SURGERY

## 2024-06-25 PROCEDURE — 3700000001 HC ADD 15 MINUTES (ANESTHESIA): Performed by: SURGERY

## 2024-06-25 RX ORDER — DEXAMETHASONE SODIUM PHOSPHATE 4 MG/ML
INJECTION, SOLUTION INTRA-ARTICULAR; INTRALESIONAL; INTRAMUSCULAR; INTRAVENOUS; SOFT TISSUE PRN
Status: DISCONTINUED | OUTPATIENT
Start: 2024-06-25 | End: 2024-06-25 | Stop reason: SDUPTHER

## 2024-06-25 RX ORDER — INSULIN LISPRO 100 [IU]/ML
5 INJECTION, SOLUTION INTRAVENOUS; SUBCUTANEOUS ONCE
Status: COMPLETED | OUTPATIENT
Start: 2024-06-25 | End: 2024-06-25

## 2024-06-25 RX ORDER — SODIUM CHLORIDE 9 MG/ML
INJECTION, SOLUTION INTRAVENOUS PRN
Status: DISCONTINUED | OUTPATIENT
Start: 2024-06-25 | End: 2024-06-25 | Stop reason: HOSPADM

## 2024-06-25 RX ORDER — SODIUM CHLORIDE 0.9 % (FLUSH) 0.9 %
5-40 SYRINGE (ML) INJECTION EVERY 12 HOURS SCHEDULED
Status: DISCONTINUED | OUTPATIENT
Start: 2024-06-25 | End: 2024-06-25 | Stop reason: HOSPADM

## 2024-06-25 RX ORDER — TRAMADOL HYDROCHLORIDE 50 MG/1
50 TABLET ORAL ONCE
Status: COMPLETED | OUTPATIENT
Start: 2024-06-25 | End: 2024-06-25

## 2024-06-25 RX ORDER — EPHEDRINE SULFATE/0.9% NACL/PF 25 MG/5 ML
SYRINGE (ML) INTRAVENOUS PRN
Status: DISCONTINUED | OUTPATIENT
Start: 2024-06-25 | End: 2024-06-25 | Stop reason: SDUPTHER

## 2024-06-25 RX ORDER — SODIUM CHLORIDE 9 MG/ML
INJECTION, SOLUTION INTRAVENOUS CONTINUOUS
Status: DISCONTINUED | OUTPATIENT
Start: 2024-06-25 | End: 2024-06-25 | Stop reason: HOSPADM

## 2024-06-25 RX ORDER — SODIUM CHLORIDE 0.9 % (FLUSH) 0.9 %
5-40 SYRINGE (ML) INJECTION PRN
Status: DISCONTINUED | OUTPATIENT
Start: 2024-06-25 | End: 2024-06-25 | Stop reason: HOSPADM

## 2024-06-25 RX ORDER — BUPIVACAINE HYDROCHLORIDE 2.5 MG/ML
INJECTION, SOLUTION EPIDURAL; INFILTRATION; INTRACAUDAL PRN
Status: DISCONTINUED | OUTPATIENT
Start: 2024-06-25 | End: 2024-06-25 | Stop reason: ALTCHOICE

## 2024-06-25 RX ORDER — MIDAZOLAM HYDROCHLORIDE 1 MG/ML
INJECTION INTRAMUSCULAR; INTRAVENOUS PRN
Status: DISCONTINUED | OUTPATIENT
Start: 2024-06-25 | End: 2024-06-25 | Stop reason: SDUPTHER

## 2024-06-25 RX ORDER — ONDANSETRON 2 MG/ML
INJECTION INTRAMUSCULAR; INTRAVENOUS PRN
Status: DISCONTINUED | OUTPATIENT
Start: 2024-06-25 | End: 2024-06-25 | Stop reason: SDUPTHER

## 2024-06-25 RX ORDER — TRAMADOL HYDROCHLORIDE 50 MG/1
50 TABLET ORAL EVERY 4 HOURS PRN
Qty: 18 TABLET | Refills: 0 | Status: SHIPPED | OUTPATIENT
Start: 2024-06-25 | End: 2024-06-28

## 2024-06-25 RX ORDER — DIPHENHYDRAMINE HYDROCHLORIDE 50 MG/ML
12.5 INJECTION INTRAMUSCULAR; INTRAVENOUS
Status: DISCONTINUED | OUTPATIENT
Start: 2024-06-25 | End: 2024-06-25 | Stop reason: HOSPADM

## 2024-06-25 RX ORDER — ONDANSETRON 2 MG/ML
4 INJECTION INTRAMUSCULAR; INTRAVENOUS
Status: DISCONTINUED | OUTPATIENT
Start: 2024-06-25 | End: 2024-06-25 | Stop reason: HOSPADM

## 2024-06-25 RX ORDER — LIDOCAINE HYDROCHLORIDE 10 MG/ML
INJECTION, SOLUTION EPIDURAL; INFILTRATION; INTRACAUDAL; PERINEURAL PRN
Status: DISCONTINUED | OUTPATIENT
Start: 2024-06-25 | End: 2024-06-25 | Stop reason: ALTCHOICE

## 2024-06-25 RX ORDER — LIDOCAINE HYDROCHLORIDE 10 MG/ML
1 INJECTION, SOLUTION EPIDURAL; INFILTRATION; INTRACAUDAL; PERINEURAL
Status: DISCONTINUED | OUTPATIENT
Start: 2024-06-25 | End: 2024-06-25 | Stop reason: HOSPADM

## 2024-06-25 RX ORDER — PROPOFOL 10 MG/ML
INJECTION, EMULSION INTRAVENOUS PRN
Status: DISCONTINUED | OUTPATIENT
Start: 2024-06-25 | End: 2024-06-25 | Stop reason: SDUPTHER

## 2024-06-25 RX ORDER — DEXTROSE MONOHYDRATE 100 MG/ML
INJECTION, SOLUTION INTRAVENOUS CONTINUOUS PRN
Status: DISCONTINUED | OUTPATIENT
Start: 2024-06-25 | End: 2024-06-25 | Stop reason: HOSPADM

## 2024-06-25 RX ORDER — NALOXONE HYDROCHLORIDE 0.4 MG/ML
INJECTION, SOLUTION INTRAMUSCULAR; INTRAVENOUS; SUBCUTANEOUS PRN
Status: DISCONTINUED | OUTPATIENT
Start: 2024-06-25 | End: 2024-06-25 | Stop reason: HOSPADM

## 2024-06-25 RX ORDER — GABAPENTIN 100 MG/1
100 CAPSULE ORAL ONCE
Status: COMPLETED | OUTPATIENT
Start: 2024-06-25 | End: 2024-06-25

## 2024-06-25 RX ORDER — MEPERIDINE HYDROCHLORIDE 25 MG/ML
12.5 INJECTION INTRAMUSCULAR; INTRAVENOUS; SUBCUTANEOUS EVERY 5 MIN PRN
Status: DISCONTINUED | OUTPATIENT
Start: 2024-06-25 | End: 2024-06-25 | Stop reason: HOSPADM

## 2024-06-25 RX ORDER — DEXAMETHASONE SODIUM PHOSPHATE 4 MG/ML
4 INJECTION, SOLUTION INTRA-ARTICULAR; INTRALESIONAL; INTRAMUSCULAR; INTRAVENOUS; SOFT TISSUE
Status: DISCONTINUED | OUTPATIENT
Start: 2024-06-25 | End: 2024-06-25 | Stop reason: HOSPADM

## 2024-06-25 RX ORDER — FENTANYL CITRATE 0.05 MG/ML
25 INJECTION, SOLUTION INTRAMUSCULAR; INTRAVENOUS EVERY 5 MIN PRN
Status: DISCONTINUED | OUTPATIENT
Start: 2024-06-25 | End: 2024-06-25 | Stop reason: HOSPADM

## 2024-06-25 RX ORDER — FENTANYL CITRATE 50 UG/ML
INJECTION, SOLUTION INTRAMUSCULAR; INTRAVENOUS PRN
Status: DISCONTINUED | OUTPATIENT
Start: 2024-06-25 | End: 2024-06-25 | Stop reason: SDUPTHER

## 2024-06-25 RX ORDER — LIDOCAINE HYDROCHLORIDE 10 MG/ML
INJECTION, SOLUTION EPIDURAL; INFILTRATION; INTRACAUDAL; PERINEURAL PRN
Status: DISCONTINUED | OUTPATIENT
Start: 2024-06-25 | End: 2024-06-25 | Stop reason: SDUPTHER

## 2024-06-25 RX ADMIN — SODIUM CHLORIDE: 9 INJECTION, SOLUTION INTRAVENOUS at 06:44

## 2024-06-25 RX ADMIN — FENTANYL CITRATE 50 MCG: 50 INJECTION, SOLUTION INTRAMUSCULAR; INTRAVENOUS at 08:46

## 2024-06-25 RX ADMIN — DEXAMETHASONE SODIUM PHOSPHATE 8 MG: 4 INJECTION INTRA-ARTICULAR; INTRALESIONAL; INTRAMUSCULAR; INTRAVENOUS; SOFT TISSUE at 08:22

## 2024-06-25 RX ADMIN — MIDAZOLAM 2 MG: 1 INJECTION INTRAMUSCULAR; INTRAVENOUS at 08:10

## 2024-06-25 RX ADMIN — EPHEDRINE SULFATE 5 MG: 5 INJECTION INTRAVENOUS at 08:23

## 2024-06-25 RX ADMIN — TRAMADOL HYDROCHLORIDE 50 MG: 50 TABLET, COATED ORAL at 10:57

## 2024-06-25 RX ADMIN — FENTANYL CITRATE 50 MCG: 50 INJECTION, SOLUTION INTRAMUSCULAR; INTRAVENOUS at 08:15

## 2024-06-25 RX ADMIN — INSULIN LISPRO 5 UNITS: 100 INJECTION, SOLUTION INTRAVENOUS; SUBCUTANEOUS at 07:04

## 2024-06-25 RX ADMIN — EPHEDRINE SULFATE 10 MG: 5 INJECTION INTRAVENOUS at 08:38

## 2024-06-25 RX ADMIN — GABAPENTIN 100 MG: 100 CAPSULE ORAL at 06:47

## 2024-06-25 RX ADMIN — PROPOFOL 150 MG: 10 INJECTION, EMULSION INTRAVENOUS at 08:15

## 2024-06-25 RX ADMIN — EPHEDRINE SULFATE 10 MG: 5 INJECTION INTRAVENOUS at 08:27

## 2024-06-25 RX ADMIN — Medication 2000 MG: at 08:18

## 2024-06-25 RX ADMIN — LIDOCAINE HYDROCHLORIDE 50 MG: 10 INJECTION, SOLUTION EPIDURAL; INFILTRATION; INTRACAUDAL; PERINEURAL at 08:15

## 2024-06-25 RX ADMIN — ONDANSETRON 4 MG: 2 INJECTION INTRAMUSCULAR; INTRAVENOUS at 08:22

## 2024-06-25 ASSESSMENT — PAIN DESCRIPTION - ORIENTATION: ORIENTATION: LEFT

## 2024-06-25 ASSESSMENT — PAIN DESCRIPTION - LOCATION: LOCATION: BREAST

## 2024-06-25 ASSESSMENT — PAIN - FUNCTIONAL ASSESSMENT
PAIN_FUNCTIONAL_ASSESSMENT: NONE - DENIES PAIN

## 2024-06-25 ASSESSMENT — PAIN SCALES - GENERAL: PAINLEVEL_OUTOF10: 5

## 2024-06-25 ASSESSMENT — PAIN DESCRIPTION - DESCRIPTORS: DESCRIPTORS: ACHING;BURNING

## 2024-06-25 NOTE — BRIEF OP NOTE
Brief Postoperative Note      Patient: Lucero Rose  YOB: 1955  MRN: [id number]    Date of Procedure: 6/25/2024    Pre-Op Diagnosis Codes:     * Bloody discharge from nipple [N64.52]    Post-Op Diagnosis: Same       Procedure(s):  BREAST CENTRAL DUCT EXCISION, LEFT    Surgeon(s):  Michaela Saldana MD    Assistant:  * No surgical staff found *    Anesthesia: General    Estimated Blood Loss (mL): Minimal    Complications: None    Specimens:   ID Type Source Tests Collected by Time Destination   A : LEFT BREAST BIOPSY SHORT STITCH SUPERIOR LONG STITCH LATERAL INK ANTERIOR Tissue Breast SURGICAL PATHOLOGY Michaela Saldana MD 6/25/2024 0847        Implants:  * No implants in log *      Drains: * No LDAs found *    Findings:  Infection Present At Time Of Surgery (PATOS) (choose all levels that have infection present):  No infection present  Other Findings: none    Electronically signed by Michaela Saldana MD on 6/25/2024 at 10:02 AM

## 2024-06-25 NOTE — INTERVAL H&P NOTE
Update History & Physical    The patient's History and Physical of June 14, 2024 was reviewed with the patient and I examined the patient. There was no change. The surgical site was confirmed by the patient and me.     Pt undergoing BREAST CENTRAL DUCT EXCISION, LEFT per Dr. Saldana for Bloody discharge from nipple   Pt denies fever/chills, chest pain or SOB  Pt NPO since MN, No am medication today  Pt denies hx MRSA  Pt reports hx of blood clots. 10-15 years ago PE in chart but pt unsure of location   Anticoagulation None  Denies personal hx and family hx of complications with anesthesia  Physical exam remains unchanged including cardiac and pulmonary assessment  See nursing flow sheet for vital signs     Electronically signed by NICOLÁS Slade CNP on 6/25/2024 at 5:56 AM

## 2024-06-25 NOTE — ANESTHESIA POSTPROCEDURE EVALUATION
Department of Anesthesiology  Postprocedure Note    Patient: Lucero Rose  MRN: [id number]  YOB: 1955  Date of evaluation: 6/25/2024    Procedure Summary       Date: 06/25/24 Room / Location: [address]    Anesthesia Start: 0811 Anesthesia Stop: 0933    Procedure: BREAST CENTRAL DUCT EXCISION, LEFT (Left) Diagnosis:       Bloody discharge from nipple      (Bloody discharge from nipple [N64.52])    Surgeons: Michaela Saldana MD Responsible Provider:     Anesthesia Type: general ASA Status: 3            Anesthesia Type: No value filed.    Narcisa Phase I: Narcisa Score: 3    Narcisa Phase II:      Anesthesia Post Evaluation    Patient location during evaluation: PACU  Patient participation: complete - patient participated  Level of consciousness: awake  Pain score: 2  Airway patency: patent  Nausea & Vomiting: no nausea  Cardiovascular status: blood pressure returned to baseline  Respiratory status: acceptable  Hydration status: euvolemic  Pain management: adequate    No notable events documented.

## 2024-06-25 NOTE — DISCHARGE INSTRUCTIONS
Regarding the wound:  1 ) keep the dressing on for one day and keep it dry.   2 ) You can shower after you remove the dressing. There are paper tapes on your incisions called steri-strips. Do not scrub the wound. Let the soap and water run over the incision and pat it dry.  3 ) Wear a supportive bra nonstop except for showering for at least 5 days.    Regarding pain management:  1 ) You have tramadol ordered for you for pain. I ordered it because it was not listed as one of your allergies. Please take Tylenol 650 mg every 4-6 hours scheduled for 2 days and then take tramadol if you are still having pain.  Have benadryl available in case you have itchiness as a result.  2) You can apply ice/cold compresses for 20-30 minutes at a time as often as you like to help with pain.    Regarding activity:  1 ) take it easy and limit activity with your arm on the surgical side.  2 ) You can resume your normal activity gradually and as tolerated after 3 days.       DISCHARGE INSTRUCTIONS FOR GENERAL ANESTHESIA    In order to continue your care at home, please follow the instructions below.    Anesthesia - Do not drink any alcoholic beverages or make any legal or important decisions for 24 hours. If your surgery was on an extremity or abdomen, do not drive or operate any machinery until your surgeon approves, otherwise do not drive or operate any machinery for 24 hours or as restricted by your surgeon.     Pain Medications - Please do not drive, operate machinery, or drink alcoholic beverages while taking any prescribed pain medication.     Diet -  Start out eating lightly (broth, soup, crackers, toast, etc.) advancing as tolerated to your usual diet.  Try to avoid spicy and greasy/fatty foods for 24 hours. Drink plenty of fluids after surgery, unless you are on a fluid restriction.  Avoid milk/milk product for several hours.    Call your surgeon for the following:  You have pain that does not get better after you take pain

## 2024-06-25 NOTE — ANESTHESIA PRE PROCEDURE
Department of Anesthesiology  Preprocedure Note       Name:  Lucero Rose   Age:  68 y.o.  :  1955                                          MRN:  [id number]         Date:  2024      Surgeon: Surgeon(s):  Michaela Saldana MD    Procedure: Procedure(s):  BREAST CENTRAL DUCT EXCISION, LEFT    Medications prior to admission:   Prior to Admission medications    Medication Sig Start Date End Date Taking? Authorizing Provider   amLODIPine (NORVASC) 5 MG tablet take 1 tablet by mouth once daily 6/10/24   Lauren Miller MD   carvedilol (COREG) 25 MG tablet take 1 tablet by mouth twice a day with meals 6/10/24   Lauren Miller MD   levothyroxine (SYNTHROID) 25 MCG tablet take 1 tablet by mouth every morning ON AN EMPTY STOMACH 24   Lauren Miller MD   pitavastatin (LIVALO) 4 MG TABS tablet Take 1 tablet by mouth nightly 4/10/24   Lauren Miller MD   JARDIANCE 25 MG tablet Take 1 tablet by mouth every morning    Maksim Baltazar MD   ONETOUCH ULTRA strip TEST ONCE DAILY AS NEEDED FOR HIGH BLOOD SUGAR  FOR SENSOR FAILURE 24   Maksim Baltazar MD   DROPLET PEN NEEDLES 32G X 4 MM MISC USE UP TO 5 TIMES DAILY 24   Maksim Baltazar MD   EPINEPHrine (EPIPEN) 0.3 MG/0.3ML SOAJ injection Inject 0.3 mLs into the muscle as needed (allegic reaction) Use as directed for allergic reaction 24   Pierre Horn,    MONOJECT ULTRA COMFORT SYRINGE 30G X 5/16\" 0.3 ML MISC Inject 1 Dose into the skin as needed 23   Maksim Baltazar MD   spironolactone (ALDACTONE) 50 MG tablet Take 1 tablet by mouth daily 23   Maksim Baltazar MD   insulin lispro, 0.5 Unit Dial, 100 UNIT/ML SOPN Inject into the skin Sliding scale  Humalog 20   Maksim Baltazar MD   Continuous Blood Gluc Transmit (DEXCOM G6 TRANSMITTER) MISC  23   Maksim Baltazar MD   Continuous Blood Gluc Sensor (DEXCOM G6 SENSOR) MISC Apply 1 application topically every 10 days 23   Maksim Baltazar MD

## 2024-06-25 NOTE — OP NOTE
tissues were re-approximated with 2-0 vicryl followed by 3-0 vicryl buried interrupted suture for the deep dermal layer and 4-0 monocryl in a running subcuticular for the superficial dermal layer.  Mastisol and steri-strips were applied, followed by fluffs, ABD pad, and a bra.      Patient was awakened and returned to recovery in stable condition.  Needle, sponge, and instrument counts were correct at the conclusion of the case x2.     Electronically signed by Michaela Saldana MD on 6/25/2024 at 10:23 AM

## 2024-06-26 DIAGNOSIS — E78.5 HYPERLIPIDEMIA, UNSPECIFIED HYPERLIPIDEMIA TYPE: ICD-10-CM

## 2024-06-26 RX ORDER — PITAVASTATIN CALCIUM 4.18 MG/1
4 TABLET, FILM COATED ORAL NIGHTLY
Qty: 90 TABLET | Refills: 0 | Status: SHIPPED | OUTPATIENT
Start: 2024-06-26

## 2024-06-26 NOTE — TELEPHONE ENCOUNTER
OUTPATIENT PROGRESS NOTE  TRANSITIONAL CARE MANAGEMENT - HOSPITAL DISCHARGE FOLLOW-UP      CHIEF COMPLAINT  Transitional Care Management      Ms. Nichols is unaccompanied today.    NOTE:  At her ONE previous visit to me on June 7th, 2021, Hungarian  was used for entire visit, BUT today she started the visit speaking very good English, including discussion of complicated events regarding recent hospitalization, and did not ask for , so this entire visit was done without Hungarian .    SUBJECTIVE   The patient was discharged from the hospital on 06/10/2021. The [facility] Discharge Summary was reviewed. It documents that the patient was hospitalized for :  \"Discharge Diagnoses: Principal Problem:  Syncope and collapse  Active Problems:  Chest pain  Essential hypertension    Major Procedures/Treatments: Echocardiogram unremarkable.\"    Pertinent un-finalized hospital performed diagnostic tests - during and after this appointment via Care Everywhere, I checked [facility] records for the entire admission from 06/09 til discharge on 06/10/2021, and COULD NOT FIND the actual ECHO reported performed on 06/10/2021 which is referred to only at the beginning of the discharge summary as above.    Pertinent un-finalized hospital lab tests - only the MISSING ECHO as above.  NOTE : I reviewed with patient the following [facility] bloodwork results from June 9th :   - GFR >60 ( is the normal at [facility]), random glucose 95, Na 139, K 3.3, Hct 42.5, TSH 1.88.    Advance Directive:   The patient has the following documents:  No Advance Directives on file. Patient offered documents.    Durable Medical Equipment/Assistive devices prescribed: None     MEDICATIONS  The discharge medication list was reviewed. Outpatient medications were updated today. She is fully compliant with the medication regimen prescribed at the time of discharge.    She today asked me to refill her prescription eye drop, and I agreed to  Rx refilled   issue 2 refills, but informed her she needs to have this refilled and managed by her eye doctor.    HISTORIES  I have personally reviewed and updated the following electronic medical record sections: Problem List and Past Medical History.    REVIEW OF SYSTEMS  She recalls substernal chest pain when she had syncope in a restaurant the day after 06/08/2021 EGD with dilation of esophageal stricture by [facility], Dr. Benavides.  She today has a c/o of \"bitter\" taste, and confirms she is taking Biaxin prescribed by Dr. Benavides along with the other meds he prescribed from that EGD for finding of POS for H pylori.    She today confirms she did start Amlodipine 5 mg tabs from recent June 7th appointment with me per my instruction to just take 1/2 tab daily.    She today points to her finger tips which look like eczema, and asked me to treat this.    PHYSICAL EXAM  Visit Vitals  BP (!) 174/90   Pulse 64   Wt 86.2 kg   SpO2 98%   BMI 31.62 kg/m²     PE:  ( vitals were reviewed) as above..  GENERAL: Well developed, obese, alert woman, who again appears very appropriately concerned, but does not appear dyspneic, ill or in any obvious discomfort at this time.   NECK:  no lymphadenopathy.  Carotids are 2+ and symmetric, no bruit.  LUNGS:  clear to auscultation, no crackles, wheezes, or rhonchi, and good air movement posteriorly,over the right middle lobe,anteriorly. Has no dullness to percussion.  CARDIAC:  Regular rate and rhythm,Normal S1 and S2,no murmur. Has no gallop.  ABDOMEN: Nondistended, soft, non-tender. BS normal. No masses or organomegaly.  SKIN / HANDS :  Fingertips have skin changes c/w eczema.    [facility] LAB DATA :  ON 6/7/2021 direct LDL was 104.      ASSESSMENT  1. Vasovagal syncope    2. Uncontrolled hypertension    3. Coronary arteriosclerosis    4. Esophageal stricture    5. Rapid urease test for Helicobacter pylori infection positive    6. Hyperlipidemia LDL goal <100    7. Hand eczema        PLAN  1)   Uncontrolled HTN and recent Vasovagal syncope after chest pain that might have been acid reflux and occurred in restroom after having a bowel movement the day after had EGD dilation of esophageal stricture.  Advised continuing all current meds for now, including 2.5 mg dose of Amlodipine I started less than 2 weeks ago, because of recent syncope even though BP still elevated today at 174/90, and verifying accuracy of home BP machine to allow us to titrate up Amlodipine dose; See patient instructions and orders.  NOTE : I anticipate [place] ECHO result should be available within days; See patient instructions.  2)  Current therapy to treat POS H pylori found on June 8th with esophageal stricture was dilated.  I informed patient that bitter taste is a temporary side effect of Biaxin to treat the H pylori.  3)  Hyperlipidemia with LDL goal of < 100 -- and LDL of 104 is close, so for now will continue current statin.  4)  Hand eczema --  recommended treatment detailed in patient instructions and orders.  5)  ?Glaucoma? -- I eprescribed Latanoprost eye drop, but advised eye drops should be managed by eye doctor.    Patient adherence to her treatment plan was assessed. She is fully compliant with the entire discharge treatment plan. Patient was seen for a detailed history, detailed examination, medical decision making of moderate complexity .

## 2024-06-27 LAB — SURGICAL PATHOLOGY REPORT: NORMAL

## 2024-07-03 ENCOUNTER — OFFICE VISIT (OUTPATIENT)
Age: 69
End: 2024-07-03
Payer: MEDICARE

## 2024-07-03 VITALS
HEIGHT: 65 IN | BODY MASS INDEX: 39.15 KG/M2 | DIASTOLIC BLOOD PRESSURE: 51 MMHG | TEMPERATURE: 97.5 F | SYSTOLIC BLOOD PRESSURE: 148 MMHG | RESPIRATION RATE: 16 BRPM | WEIGHT: 235 LBS | HEART RATE: 69 BPM

## 2024-07-03 DIAGNOSIS — N76.0 VULVOVAGINITIS: ICD-10-CM

## 2024-07-03 DIAGNOSIS — R30.0 DYSURIA: Primary | ICD-10-CM

## 2024-07-03 LAB
BACTERIA URINE, POC: ABNORMAL
BILIRUBIN URINE: 0 MG/DL
BLOOD, URINE: POSITIVE
CASTS URINE, POC: ABNORMAL
CLARITY, UA: CLEAR
COLOR, UA: ABNORMAL
CRYSTALS URINE, POC: ABNORMAL
EPI CELLS URINE, POC: ABNORMAL
GLUCOSE URINE: 0
KETONES, URINE: NEGATIVE
LEUKOCYTE EST, POC: 0
NITRITE, URINE: NEGATIVE
PH UA: 6 (ref 4.5–8)
PROTEIN UA: NEGATIVE
RBC URINE, POC: ABNORMAL
SPECIFIC GRAVITY UA: 1.01 (ref 1–1.03)
UROBILINOGEN, URINE: ABNORMAL
WBC URINE, POC: ABNORMAL
YEAST URINE, POC: ABNORMAL

## 2024-07-03 PROCEDURE — 81000 URINALYSIS NONAUTO W/SCOPE: CPT

## 2024-07-03 RX ORDER — LACTOBACILLUS RHAMNOSUS GG 10B CELL
1 CAPSULE ORAL DAILY
Qty: 30 CAPSULE | Refills: 1 | Status: SHIPPED | OUTPATIENT
Start: 2024-07-03

## 2024-07-03 RX ORDER — ESTRADIOL 10 UG/1
10 INSERT VAGINAL DAILY
Qty: 30 TABLET | Refills: 1 | Status: SHIPPED | OUTPATIENT
Start: 2024-07-03 | End: 2024-07-12

## 2024-07-03 RX ORDER — ESTRADIOL 10 UG/1
INSERT VAGINAL
Qty: 30 TABLET | Refills: 1 | Status: SHIPPED | OUTPATIENT
Start: 2024-07-03 | End: 2024-07-12

## 2024-07-03 SDOH — ECONOMIC STABILITY: FOOD INSECURITY: WITHIN THE PAST 12 MONTHS, YOU WORRIED THAT YOUR FOOD WOULD RUN OUT BEFORE YOU GOT MONEY TO BUY MORE.: NEVER TRUE

## 2024-07-03 SDOH — ECONOMIC STABILITY: FOOD INSECURITY: WITHIN THE PAST 12 MONTHS, THE FOOD YOU BOUGHT JUST DIDN'T LAST AND YOU DIDN'T HAVE MONEY TO GET MORE.: NEVER TRUE

## 2024-07-03 SDOH — ECONOMIC STABILITY: INCOME INSECURITY: HOW HARD IS IT FOR YOU TO PAY FOR THE VERY BASICS LIKE FOOD, HOUSING, MEDICAL CARE, AND HEATING?: NOT HARD AT ALL

## 2024-07-03 NOTE — PROGRESS NOTES
ON AN EMPTY STOMACH 90 tablet 0    ONETOUCH ULTRA strip TEST ONCE DAILY AS NEEDED FOR HIGH BLOOD SUGAR  FOR SENSOR FAILURE      DROPLET PEN NEEDLES 32G X 4 MM MISC USE UP TO 5 TIMES DAILY      EPINEPHrine (EPIPEN) 0.3 MG/0.3ML SOAJ injection Inject 0.3 mLs into the muscle as needed (allegic reaction) Use as directed for allergic reaction 2 each 0    MONOJECT ULTRA COMFORT SYRINGE 30G X 5/16\" 0.3 ML MISC Inject 1 Dose into the skin as needed      spironolactone (ALDACTONE) 50 MG tablet Take 1 tablet by mouth daily      insulin lispro, 0.5 Unit Dial, 100 UNIT/ML SOPN Inject into the skin Sliding scale  Humalog      Continuous Blood Gluc Transmit (DEXCOM G6 TRANSMITTER) MISC       Continuous Blood Gluc Sensor (DEXCOM G6 SENSOR) MISC Apply 1 application topically every 10 days      Continuous Blood Gluc  (DEXCOM G6 ) JAYY 1 Device by Does not apply route every 3 months      vitamin D (CHOLECALCIFEROL) 25 MCG (1000 UT) TABS tablet Take 1 tablet by mouth daily      BD INSULIN SYRINGE U/F 31G X 5/16\" 1 ML MISC USE AS DIRECTED before meals and at bedtime      vitamin B-12 (CYANOCOBALAMIN) 1000 MCG tablet take 1 tablet by mouth once daily 90 tablet 3    insulin glargine (LANTUS) 100 UNIT/ML injection vial Inject 90 Units into the skin 2 times daily      JARDIANCE 25 MG tablet Take 1 tablet by mouth every morning (Patient not taking: Reported on 7/3/2024)       No current facility-administered medications for this visit.        Key Antihyperglycemic Medications               insulin lispro, 0.5 Unit Dial, 100 UNIT/ML SOPN (Taking) Inject into the skin Sliding scale  Humalog    insulin glargine (LANTUS) 100 UNIT/ML injection vial (Taking) Inject 90 Units into the skin 2 times daily    JARDIANCE 25 MG tablet Take 1 tablet by mouth every morning            Allergies   Allergen Reactions    Latex Itching, Other (See Comments) and Rash     Pt states very slight reaction after prolonged exsposure    Rosuvastatin

## 2024-07-03 NOTE — PATIENT INSTRUCTIONS
It was nice to meet you and thank you for coming in today!      Here is our plan:    1)Urinary pain and itching  -Please continue Vagisil wipes for 5 days   -Please pick of Lactobacillus Cultrella or Florajen  -You may also benefit from vaginal estrogen to keep this from happening again directions are below  - hypoallergic toilet paper and wipes        Insert 1 tablet intravaginally daily for 2 weeks, followed by twice weekly.               2) Please sign up for [facility] and follow up with Dr. Miller next week about your diabetes. Please continue to start jardiance as you had planned     Please call to schedule follow up if not better in 2 days    Thank you for choosing me and our team at [facility] to be partners with you in your health.    Do not hesitate to call with any questions or concerns. You may also message me your questions on [facility] so please sign up!    -Anamaria    Our Hours of Operation and Contact Information:    Scheduling and Health Questions:  -If you ave any questions or concerns please message the nursing staff on [facility] or call our office at [phone] during our business hours of Mon,Tues, Wed, Friday 8AM-5PM. Thursday 8AM-12.  -Your questions/concerns will be answered by the clinical staff or forwarded to the physician's    If you have any emergent concerns outside of business hours and can not wait please call our office number listed above and you will be directed to the On-Call Physician. If you can not reach us for any reason or the emergent situation is rapidly escalating please call 911 immediately     Laboratory services   -available Mon-Friday 7:30AM-4PM Daily with a Lunch break 12:30-1PM Daily. You may reach the Lab directly at [phone]

## 2024-07-12 ENCOUNTER — OFFICE VISIT (OUTPATIENT)
Age: 69
End: 2024-07-12
Payer: MEDICARE

## 2024-07-12 VITALS
SYSTOLIC BLOOD PRESSURE: 130 MMHG | TEMPERATURE: 97.7 F | DIASTOLIC BLOOD PRESSURE: 54 MMHG | HEART RATE: 59 BPM | RESPIRATION RATE: 18 BRPM | BODY MASS INDEX: 39.11 KG/M2 | WEIGHT: 235 LBS

## 2024-07-12 DIAGNOSIS — N95.2 ATROPHIC VAGINITIS: Primary | ICD-10-CM

## 2024-07-12 DIAGNOSIS — L98.491 SKIN ULCER OF ABDOMEN, LIMITED TO BREAKDOWN OF SKIN (HCC): ICD-10-CM

## 2024-07-12 PROCEDURE — 1123F ACP DISCUSS/DSCN MKR DOCD: CPT

## 2024-07-12 PROCEDURE — 3078F DIAST BP <80 MM HG: CPT

## 2024-07-12 PROCEDURE — 99214 OFFICE O/P EST MOD 30 MIN: CPT

## 2024-07-12 PROCEDURE — 3075F SYST BP GE 130 - 139MM HG: CPT

## 2024-07-12 NOTE — PROGRESS NOTES
Vaping Use: Never used   Substance and Sexual Activity    Alcohol use: No    Drug use: No     Social Determinants of Health     Financial Resource Strain: Low Risk  (7/3/2024)    Overall Financial Resource Strain (CARDIA)     Difficulty of Paying Living Expenses: Not hard at all   Food Insecurity: No Food Insecurity (7/3/2024)    Hunger Vital Sign     Worried About Running Out of Food in the Last Year: Never true     Ran Out of Food in the Last Year: Never true   Transportation Needs: Unknown (7/3/2024)    PRAPARE - Transportation     Lack of Transportation (Non-Medical): No   Housing Stability: Unknown (7/3/2024)    Housing Stability Vital Sign     Unstable Housing in the Last Year: No        PHYSICAL EXAM   BP (!) 130/54 (Site: Left Upper Arm, Position: Sitting)   Pulse 59   Temp 97.7 °F (36.5 °C) (Oral)   Resp 18   Wt 106.6 kg (235 lb)   BMI 39.11 kg/m²    Physical Exam  Vitals reviewed.   Constitutional:       General: She is not in acute distress.     Appearance: She is obese. She is not ill-appearing.   Cardiovascular:      Rate and Rhythm: Normal rate and regular rhythm.      Heart sounds: Normal heart sounds.   Pulmonary:      Effort: Pulmonary effort is normal.      Breath sounds: Normal breath sounds. No wheezing.   Chest:       Abdominal:      General: Abdomen is protuberant.      Palpations: Abdomen is soft.      Tenderness: There is no abdominal tenderness.       Musculoskeletal:         General: Normal range of motion.      Right lower leg: Edema (trace pitting) present.      Left lower leg: Edema (trace pitting) present.   Skin:     General: Skin is warm and dry.      Capillary Refill: Capillary refill takes less than 2 seconds.      Comments: Pressure ulcer of the skin involving only the superficial layer worse on the right than left under the pannus. No erythema, rash, satellite lesions, excoriation. Skin is moist.    Neurological:      General: No focal deficit present.      Mental Status:

## 2024-07-12 NOTE — PATIENT INSTRUCTIONS
Thank you for following up with us at [facility] outpatient residency clinic! It was a pleasure to meet you today!     Our plan is the following:  - Please do not  Vagifem.    - Continue using over-the-counter wipes and anti-itch.  The best thing that would help with vaginal dryness is vaginal moisturizer such as Replens or Vagisil.  Follow the instructions on the box to use.  - Gently wash area under skin folds with warm water and a soft washcloth. Rinse well and dry completely. Use a blow-dryer on a warm setting to get the area fully dry. You apply an ointments such as A+D Ointment, Desitin, Diaparene, or zinc oxide. Call if the rash doesn't get better or if signs of infection develop (such as increased pain, swelling, warmth, or redness).    If you have any additional questions or concerns, please call the office ([phone]) and speak to one of the staff. They will triage and forward the message to the doctors! Have a great rest of your day!

## 2024-07-15 ASSESSMENT — ENCOUNTER SYMPTOMS
ABDOMINAL PAIN: 0
SHORTNESS OF BREATH: 0
NAUSEA: 0
VOMITING: 0

## 2024-07-16 ENCOUNTER — OFFICE VISIT (OUTPATIENT)
Age: 69
End: 2024-07-16

## 2024-07-16 VITALS
TEMPERATURE: 98.1 F | SYSTOLIC BLOOD PRESSURE: 137 MMHG | HEART RATE: 58 BPM | OXYGEN SATURATION: 93 % | WEIGHT: 245 LBS | DIASTOLIC BLOOD PRESSURE: 89 MMHG | BODY MASS INDEX: 40.77 KG/M2 | RESPIRATION RATE: 20 BRPM

## 2024-07-16 DIAGNOSIS — D05.12 NEOPLASM OF LEFT BREAST, PRIMARY TUMOR STAGING CATEGORY TIS: DUCTAL CARCINOMA IN SITU (DCIS): Primary | ICD-10-CM

## 2024-07-16 PROCEDURE — 99024 POSTOP FOLLOW-UP VISIT: CPT | Performed by: SURGERY

## 2024-07-16 NOTE — PROGRESS NOTES
Patient's Name/Date of Birth: Lucero Rose / 1955 (69 y.o.)    Date: July 16, 2024     HPI: Pt is a 69 y.o. female who presents to [facility] for postoperative evaluation.  She is status post a left central ductal excision for the nipple discharge.  Her pathology from the surgery returned showing ductal carcinoma in situ with positive posterior, anterior, and lateral margins be positive and inadequate margins inferiorly.  It is detailed below:    -  DUCTAL CARCINOMA IN SITU (DCIS), INTERMEDIATE NUCLEAR GRADE.   -  LOBULAR CARCINOMA IN SITY (LCIS), CLASSIC TYPE.   -  BACKGROUND BREAST TISSUE WITH MULTIPLE INTRADUCTAL PAPILLOMAS,   FLORID USUAL DUCTAL HYPERPLASIA, COLUMNAR CELL CHANGE, STROMAL   SCLEROSIS, AND DUCT ECTASIA.   -  DCIS PRESENT AT POSTERAND ANTERIOR MARGINS.  DCIS PRESENT <0.1 CM   FROM INFERIOR MARGIN.  CATERIZED GLAND SUSPICIOUS FOR DCIS AT LATERAL   MARGIN.     She has no complaints regarding her breast at this time.    Past Medical History:   Diagnosis Date    Anesthesia complication     pt can't lay flat- has trouble breathing due to sinus issues    Arthritis     Cancer (HCC) 10/31/2019    bladder mass and kidney    Current moderate episode of major depressive disorder, unspecified whether recurrent (HCC)     Diabetes mellitus (HCC)     type II    Diverticulitis     Egg allergy     sensitive    Excessive daytime sleepiness     Generalized anxiety disorder     GERD (gastroesophageal reflux disease)     History of left radical nephrectomy     Hx of blood clots     PE years ago    Hyperlipidemia     Hypertension     Insulin use (long-term) in type 2 diabetes (HCC)     Panic disorder     Pneumonia 03/2024    Covid 3/2024    Sleep apnea     has cpap but pt not using at present machine needs tubing    Stress incontinence     Subclinical hyperthyroidism     Wears glasses        Past Surgical History:   Procedure Laterality Date    APPENDECTOMY  in 90's    BREAST

## 2024-07-16 NOTE — PROGRESS NOTES
Review of Systems   Constitutional:  Positive for fatigue.   Respiratory:  Positive for shortness of breath.    Neurological:  Positive for weakness.

## 2024-07-17 ENCOUNTER — TELEPHONE (OUTPATIENT)
Dept: SURGERY | Age: 69
End: 2024-07-17

## 2024-07-24 DIAGNOSIS — E78.5 HYPERLIPIDEMIA, UNSPECIFIED HYPERLIPIDEMIA TYPE: ICD-10-CM

## 2024-07-24 NOTE — TELEPHONE ENCOUNTER
Patient requesting refill.  Patient had to change pharmacies due to [facility] closing and scripts were not transferred.

## 2024-07-25 RX ORDER — PITAVASTATIN CALCIUM 4.18 MG/1
4 TABLET, FILM COATED ORAL NIGHTLY
Qty: 90 TABLET | Refills: 0 | Status: SHIPPED | OUTPATIENT
Start: 2024-07-25

## 2024-07-29 ENCOUNTER — HOSPITAL ENCOUNTER (OUTPATIENT)
Dept: PREADMISSION TESTING | Age: 69
Discharge: HOME | End: 2024-07-29
Attending: SURGERY | Admitting: SURGERY

## 2024-07-29 NOTE — H&P (VIEW-ONLY)
HISTORY and PHYSICAL  [facility]       NAME:  Lucero Rose  MRN: [id number]   YOB: 1955   Date: 7/30/2024   Age: 69 y.o.  Gender: female     COMPLAINT AND PRESENT HISTORY:   Lucero Rose is 69 y.o.,  female, presents for pre-anesthesia/admission testing for Partial mastectomy re excision breast Left per Dr. Saldana.  Primary dx: Ductal carcinoma in situ of left breast    HPI:  Lucero Rose is 69 y.o.,  female, here for Ductal carcinoma in situ of left breast    Pt C/O of tenderness to left breast and with a brown nipple discharge.  The symptoms started 4 months ago.  Pt denies identifying a lump by self breast exam .  She underwent mammography, ultra sound and MRI   No recent falls or trauma. No redness, swelling or rashes.  Pt denies any other symptoms.   Denies recent fever.  Pt states she is always chilled.  Denies recent or current chest pain/pressure, palpitations, headaches, dizziness.  Reports SOBOE and lower extremity edema.       Review of additional significant medical hx:  Follows with Dr Miller Last visit 7/12/2024    HTN, HLD  Associated Medications Livalo, Norvasc, Coreg, Aldactone,   Follows with PCP  BP Readings from Last 3 Encounters:  07/30/24 (!) 160/65  07/16/24 137/89  07/12/24 (!) 130/54     DM2  Associated Medications: Lispro, Lantus  Follows with Dr Jett Last appt 4/23/2024  Hemoglobin A1C  Date Value Ref Range Status  01/23/2024 9.7 % Final     Depression Anxiety Panic disorder  Associated Medications: None  Follows with PCP    Hypohyroidism  Associated Medication:  Synthroid  Follows with PCP    Sleep apnea  No machine    Activity level:  Functional Capacity per patient:              1. Patient unable to walk 2 city blocks on level ground without SOB.              2. Patient unable to climb 2 flights of stairs without SOB.    Denies hx of MRSA infection.  Denies hx of blood clots. H/o PE >20 years ago  Anticoagulation: None  Denies hx of any  disorder     Pneumonia 03/2024    Covid 3/2024    Sleep apnea     has cpap but pt not using at present machine needs tubing    Stress incontinence     Subclinical hyperthyroidism     Wears glasses        SURGICAL HISTORY       Past Surgical History:   Procedure Laterality Date    APPENDECTOMY  in 90's    BREAST SURGERY Left 6/25/2024    BREAST CENTRAL DUCT EXCISION, LEFT performed by Michaela [facility]dana MD at [facility] OR    CHOLECYSTECTOMY, OPEN  1999    COLONOSCOPY  2016    CYSTOSCOPY  01/03/2017    CYSTOSCOPY  01/20/2017    cysto TURBT    HYSTERECTOMY (CERVIX STATUS UNKNOWN)  1994    KIDNEY REMOVAL  1999    Radical Nephrectomy, left- Dr. Seth    OTHER SURGICAL HISTORY N/A 01/20/2017    TUR-BT    OVARY REMOVAL      TONSILLECTOMY AND ADENOIDECTOMY      at age 13    TOTAL NEPHRECTOMY Left 09/1999    UMBILICAL HERNIA REPAIR      with mesh       SOCIAL HISTORY       Social History     Socioeconomic History    Marital status:      Spouse name: None    Number of children: None    Years of education: None    Highest education level: None   Tobacco Use    Smoking status: Never    Smokeless tobacco: Never   Vaping Use    Vaping Use: Never used   Substance and Sexual Activity    Alcohol use: No    Drug use: No     Social Determinants of Health     Financial Resource Strain: Low Risk  (7/3/2024)    Overall Financial Resource Strain (CARDIA)     Difficulty of Paying Living Expenses: Not hard at all   Food Insecurity: No Food Insecurity (7/3/2024)    Hunger Vital Sign     Worried About Running Out of Food in the Last Year: Never true     Ran Out of Food in the Last Year: Never true   Transportation Needs: Unknown (7/3/2024)    PRAPARE - Transportation     Lack of Transportation (Non-Medical): No   Housing Stability: Unknown (7/3/2024)    Housing Stability Vital Sign     Unstable Housing in the Last Year: No       REVIEW OF SYSTEMS      Allergies   Allergen Reactions    Latex Itching, Other (See Comments) and Rash     Pt states

## 2024-07-29 NOTE — H&P
suspicious  clusters of microcalcifications or architectural distortion.     Left breast: Stable subcentimeter oval masses and focal asymmetries  consistent with benign etiology.  Benign appearing calcifications noted  throughout the left breast.  There are few dilated ducts in the retroareolar  periareolar left breast similar to prior study for which further evaluation  with targeted ultrasound performed.  No evidence of dominant mass, suspicious  clusters of microcalcifications or architectural distortion.        US Breast limited 1/18/2024     Targeted ultrasound of the left breast performed.     Left breast: No suspicious sonographic correlate in the retroareolar  periareolar region for the left nipple discharge.  There are few ectatic and  dilated ducts without evidence of intraluminal mass.  No evidence of solid  mass or suspicious sonographic abnormalities.     IMPRESSION:  No mammographic or sonographic evidence of malignancy.  No suspicious imaging  correlate for the left nipple discharge.  Clinical follow-up is advised.     BI-RADS 2     BIRADS:  BIRADS - CATEGORY 2     Benign Findings.   Clinical follow-up is advised.  Patient will be due for  annual screening mammogram in 12 months.     OVERALL ASSESSMENT - BENIGN      PAST MEDICAL HISTORY     Past Medical History:   Diagnosis Date    Anesthesia complication     pt can't lay flat- has trouble breathing due to sinus issues    Arthritis     Cancer (HCC) 10/31/2019    bladder mass and kidney    Current moderate episode of major depressive disorder, unspecified whether recurrent (HCC)     Diabetes mellitus (HCC)     type II    Diverticulitis     Egg allergy     sensitive    Excessive daytime sleepiness     Generalized anxiety disorder     GERD (gastroesophageal reflux disease)     History of left radical nephrectomy     Hx of blood clots     PE years ago    Hyperlipidemia     Hypertension     Insulin use (long-term) in type 2 diabetes (HCC)     Panic

## 2024-07-30 ENCOUNTER — HOSPITAL ENCOUNTER (OUTPATIENT)
Dept: PREADMISSION TESTING | Age: 69
Discharge: HOME | End: 2024-08-03
Payer: MEDICARE

## 2024-07-30 VITALS
DIASTOLIC BLOOD PRESSURE: 65 MMHG | HEIGHT: 65 IN | RESPIRATION RATE: 16 BRPM | HEART RATE: 55 BPM | BODY MASS INDEX: 38.32 KG/M2 | OXYGEN SATURATION: 96 % | TEMPERATURE: 97.7 F | WEIGHT: 230 LBS | SYSTOLIC BLOOD PRESSURE: 160 MMHG

## 2024-07-30 LAB
ANION GAP SERPL CALCULATED.3IONS-SCNC: 11 MMOL/L (ref 9–17)
BASOPHILS # BLD: 0 K/UL (ref 0–0.2)
BASOPHILS NFR BLD: 0 % (ref 0–2)
BUN SERPL-MCNC: 17 MG/DL (ref 8–23)
CALCIUM SERPL-MCNC: 9.4 MG/DL (ref 8.6–10.4)
CHLORIDE SERPL-SCNC: 100 MMOL/L (ref 98–107)
CO2 SERPL-SCNC: 27 MMOL/L (ref 20–31)
CREAT SERPL-MCNC: 1 MG/DL (ref 0.5–0.9)
EOSINOPHIL # BLD: 0.2 K/UL (ref 0–0.4)
EOSINOPHILS RELATIVE PERCENT: 2 % (ref 0–4)
ERYTHROCYTE [DISTWIDTH] IN BLOOD BY AUTOMATED COUNT: 13.4 % (ref 11.5–14.9)
GFR, ESTIMATED: 61 ML/MIN/1.73M2
GLUCOSE SERPL-MCNC: 253 MG/DL (ref 70–99)
HCT VFR BLD AUTO: 41.6 % (ref 36–46)
HGB BLD-MCNC: 13.9 G/DL (ref 12–16)
LYMPHOCYTES NFR BLD: 1.9 K/UL (ref 1–4.8)
LYMPHOCYTES RELATIVE PERCENT: 24 % (ref 24–44)
MCH RBC QN AUTO: 31.8 PG (ref 26–34)
MCHC RBC AUTO-ENTMCNC: 33.4 G/DL (ref 31–37)
MCV RBC AUTO: 95.3 FL (ref 80–100)
MONOCYTES NFR BLD: 0.4 K/UL (ref 0.1–1.3)
MONOCYTES NFR BLD: 5 % (ref 1–7)
NEUTROPHILS NFR BLD: 69 % (ref 36–66)
NEUTS SEG NFR BLD: 5.2 K/UL (ref 1.3–9.1)
PLATELET # BLD AUTO: 194 K/UL (ref 150–450)
PMV BLD AUTO: 8.3 FL (ref 6–12)
POTASSIUM SERPL-SCNC: 5 MMOL/L (ref 3.7–5.3)
RBC # BLD AUTO: 4.36 M/UL (ref 4–5.2)
SODIUM SERPL-SCNC: 138 MMOL/L (ref 135–144)
WBC OTHER # BLD: 7.7 K/UL (ref 3.5–11)

## 2024-07-30 PROCEDURE — 36415 COLL VENOUS BLD VENIPUNCTURE: CPT

## 2024-07-30 PROCEDURE — APPSS45 APP SPLIT SHARED TIME 31-45 MINUTES: Performed by: NURSE PRACTITIONER

## 2024-07-30 PROCEDURE — 80048 BASIC METABOLIC PNL TOTAL CA: CPT

## 2024-07-30 PROCEDURE — 85025 COMPLETE CBC W/AUTO DIFF WBC: CPT

## 2024-07-30 ASSESSMENT — ENCOUNTER SYMPTOMS
ABDOMINAL PAIN: 0
COUGH: 1
BACK PAIN: 0
DIARRHEA: 0
ROS SKIN COMMENTS: SEE HPI
VOMITING: 0
NAUSEA: 0
SORE THROAT: 0
APNEA: 1
SHORTNESS OF BREATH: 1
GASTROINTESTINAL NEGATIVE: 1
CONSTIPATION: 0
TROUBLE SWALLOWING: 0

## 2024-07-30 NOTE — DISCHARGE INSTRUCTIONS
Pre-op Instructions For Out-Patient Surgery    Medication Instructions:  Please stop herbs and any supplements now (includes vitamins and minerals).    Please contact your surgeon and prescribing physician for pre-op instructions for any blood thinners.    If you have inhalers/aerosol treatments at home, please use them the morning of your surgery and bring the inhalers with you to the hospital.    Please take the following medications the morning of your surgery with a sip of water:    Carvedilol, Amlodipine, and Levothyroxine.  Please consult with prescribing physician as to 8/12/24 night dose of insulin.     Surgery Instructions:  After midnight before surgery:  Do not eat or drink anything, including water, mints, gum, and hard candy.  You may brush your teeth without swallowing.  No smoking, chewing tobacco, or street drugs.    Please shower or bathe before surgery.  If you were given Surgical Scrub Chlorhexidine Gluconate Liquid (CHG), please shower the night before and the morning of your surgery following the detailed instructions you received during your pre-admission visit.     Please do not wear any cologne, lotion, powder, deodorant, jewelry, piercings, perfume, makeup, nail polish, hair accessories, or hair spray on the day of surgery.  Wear loose comfortable clothing.    Leave your valuables at home but bring a payment source for any after-surgery prescriptions you plan to fill at [facility].  Bring a storage case for any glasses/contacts.    An adult who is responsible for you MUST drive you home and should be with you for the first 24 hours after surgery.     If having out-patient knee and foot surgeries, please arrange for planned crutches, walker, or wheelchair before arriving to the hospital.    The Day of Surgery:  Arrive at [facility] Surgery Entrance at the time directed by your surgeon and check in at the desk.  6:45 am     If you have a

## 2024-08-05 ENCOUNTER — TELEPHONE (OUTPATIENT)
Dept: SURGERY | Age: 69
End: 2024-08-05

## 2024-08-05 NOTE — TELEPHONE ENCOUNTER
Patient call stated she noticed nipple discharge and bleeding from her right breast that appear this morning on her bra, patient request a phone call with Dr. Saldana to discuss possible bilateral mastectomy. Patient is schedule for left breast mastectomy 8/13. Writer offer a virtual appt. Pt decline she is [place] and not remember her password. Writer informed patient I will check with Dr Saldana to discuss options.   Patient verbalized understanding

## 2024-08-06 ENCOUNTER — TELEPHONE (OUTPATIENT)
Dept: ORTHOPEDIC SURGERY | Age: 69
End: 2024-08-06

## 2024-08-06 NOTE — TELEPHONE ENCOUNTER
pt called and stated that she was returning  Dr oliveros call from yesterday. She would like to discuss her upcoming surgery and current symptoms she is having now, with bleeding and leaking from both breast now. Pt is out of state currently but will back by Friday.   It was advised that if anything became urgent to go to the closest Emergency room for care. She verbally understood.

## 2024-08-12 ENCOUNTER — ANESTHESIA EVENT (OUTPATIENT)
Dept: OPERATING ROOM | Age: 69
End: 2024-08-12
Payer: MEDICARE

## 2024-08-12 NOTE — PRE-PROCEDURE INSTRUCTIONS
No answer, left message ?                             Unable to leave message ?    When were you told to arrive at hospital ?-0815      Do you have a  ?-YES    Are you on any blood thinners ? -NONE                    If yes when did you stop taking ?    Do you have your prep Rx filled and instruction ? -N/A     Nothing to eat the day before , only clear liquids.-N/A    Are you experiencing any covid symptoms ?-NONE     Do you have any infections or rash we should be aware of ?-NONE      Do you have the Hibiclens soap to use the night before and the morning of surgery ?-YES    Nothing to eat or drink after midnight, only a sip of water to take any medication instructed to take the night before.-INSTRUCTED    Wear comfortable clothing, leave any valuables at home, remove any jewelry and body piercing .-INSTRUCTED

## 2024-08-12 NOTE — TELEPHONE ENCOUNTER
I spoke with patient to move her surgery up tomorrow, 8/13. There was a cancellation. Patient was ok with this change. Patient is now having bilateral mastectomies. I have sent an email to the last staff member in patient's chart regarding prior auth to see if this change in procedures will require an authorization.    Surg 8/13 @ 10:15  Arrival 8:15  Post  op 8/20 @ 3:30

## 2024-08-13 ENCOUNTER — HOSPITAL ENCOUNTER (OUTPATIENT)
Age: 69
Discharge: HOME | End: 2024-08-14
Attending: SURGERY | Admitting: SURGERY
Payer: MEDICARE

## 2024-08-13 ENCOUNTER — ANESTHESIA (OUTPATIENT)
Dept: OPERATING ROOM | Age: 69
End: 2024-08-13
Payer: MEDICARE

## 2024-08-13 DIAGNOSIS — D05.12 DUCTAL CARCINOMA IN SITU (DCIS) OF LEFT BREAST: Primary | ICD-10-CM

## 2024-08-13 DIAGNOSIS — N64.52 BREAST DISCHARGE: ICD-10-CM

## 2024-08-13 DIAGNOSIS — Z85.3 HISTORY OF LEFT BREAST CANCER: ICD-10-CM

## 2024-08-13 LAB
GLUCOSE BLD-MCNC: 275 MG/DL (ref 65–105)
GLUCOSE BLD-MCNC: 280 MG/DL (ref 65–105)
GLUCOSE BLD-MCNC: 308 MG/DL (ref 65–105)

## 2024-08-13 PROCEDURE — 2500000003 HC RX 250 WO HCPCS: Performed by: NURSE ANESTHETIST, CERTIFIED REGISTERED

## 2024-08-13 PROCEDURE — 88307 TISSUE EXAM BY PATHOLOGIST: CPT

## 2024-08-13 PROCEDURE — 3600000002 HC SURGERY LEVEL 2 BASE: Performed by: SURGERY

## 2024-08-13 PROCEDURE — 6370000000 HC RX 637 (ALT 250 FOR IP): Performed by: ANESTHESIOLOGY

## 2024-08-13 PROCEDURE — 6360000002 HC RX W HCPCS: Performed by: SURGERY

## 2024-08-13 PROCEDURE — 2580000003 HC RX 258: Performed by: ANESTHESIOLOGY

## 2024-08-13 PROCEDURE — 6360000002 HC RX W HCPCS: Performed by: NURSE ANESTHETIST, CERTIFIED REGISTERED

## 2024-08-13 PROCEDURE — 82947 ASSAY GLUCOSE BLOOD QUANT: CPT

## 2024-08-13 PROCEDURE — 3600000012 HC SURGERY LEVEL 2 ADDTL 15MIN: Performed by: SURGERY

## 2024-08-13 PROCEDURE — 76942 ECHO GUIDE FOR BIOPSY: CPT | Performed by: ANESTHESIOLOGY

## 2024-08-13 PROCEDURE — 7100000001 HC PACU RECOVERY - ADDTL 15 MIN: Performed by: SURGERY

## 2024-08-13 PROCEDURE — 6360000002 HC RX W HCPCS: Performed by: ANESTHESIOLOGY

## 2024-08-13 PROCEDURE — 88342 IMHCHEM/IMCYTCHM 1ST ANTB: CPT

## 2024-08-13 PROCEDURE — 3700000001 HC ADD 15 MINUTES (ANESTHESIA): Performed by: SURGERY

## 2024-08-13 PROCEDURE — 6370000000 HC RX 637 (ALT 250 FOR IP): Performed by: SURGERY

## 2024-08-13 PROCEDURE — 88341 IMHCHEM/IMCYTCHM EA ADD ANTB: CPT

## 2024-08-13 PROCEDURE — 19303 MAST SIMPLE COMPLETE: CPT | Performed by: SURGERY

## 2024-08-13 PROCEDURE — 6360000002 HC RX W HCPCS

## 2024-08-13 PROCEDURE — 7100000000 HC PACU RECOVERY - FIRST 15 MIN: Performed by: SURGERY

## 2024-08-13 PROCEDURE — 3700000000 HC ANESTHESIA ATTENDED CARE: Performed by: SURGERY

## 2024-08-13 PROCEDURE — 2709999900 HC NON-CHARGEABLE SUPPLY: Performed by: SURGERY

## 2024-08-13 PROCEDURE — 2500000003 HC RX 250 WO HCPCS

## 2024-08-13 RX ORDER — TRAMADOL HYDROCHLORIDE 50 MG/1
100 TABLET ORAL EVERY 6 HOURS PRN
Status: DISCONTINUED | OUTPATIENT
Start: 2024-08-13 | End: 2024-08-14 | Stop reason: HOSPADM

## 2024-08-13 RX ORDER — DIPHENHYDRAMINE HYDROCHLORIDE 50 MG/ML
INJECTION INTRAMUSCULAR; INTRAVENOUS PRN
Status: DISCONTINUED | OUTPATIENT
Start: 2024-08-13 | End: 2024-08-13 | Stop reason: SDUPTHER

## 2024-08-13 RX ORDER — AMLODIPINE BESYLATE 5 MG/1
5 TABLET ORAL DAILY
Status: DISCONTINUED | OUTPATIENT
Start: 2024-08-13 | End: 2024-08-14 | Stop reason: HOSPADM

## 2024-08-13 RX ORDER — SODIUM CHLORIDE 0.9 % (FLUSH) 0.9 %
5-40 SYRINGE (ML) INJECTION EVERY 12 HOURS SCHEDULED
Status: DISCONTINUED | OUTPATIENT
Start: 2024-08-13 | End: 2024-08-13 | Stop reason: HOSPADM

## 2024-08-13 RX ORDER — DEXAMETHASONE SODIUM PHOSPHATE 4 MG/ML
INJECTION, SOLUTION INTRA-ARTICULAR; INTRALESIONAL; INTRAMUSCULAR; INTRAVENOUS; SOFT TISSUE PRN
Status: DISCONTINUED | OUTPATIENT
Start: 2024-08-13 | End: 2024-08-13 | Stop reason: SDUPTHER

## 2024-08-13 RX ORDER — FENTANYL CITRATE 0.05 MG/ML
50 INJECTION, SOLUTION INTRAMUSCULAR; INTRAVENOUS EVERY 5 MIN PRN
Status: DISCONTINUED | OUTPATIENT
Start: 2024-08-13 | End: 2024-08-13 | Stop reason: HOSPADM

## 2024-08-13 RX ORDER — PROPOFOL 10 MG/ML
INJECTION, EMULSION INTRAVENOUS PRN
Status: DISCONTINUED | OUTPATIENT
Start: 2024-08-13 | End: 2024-08-13 | Stop reason: SDUPTHER

## 2024-08-13 RX ORDER — GABAPENTIN 100 MG/1
100 CAPSULE ORAL ONCE
Status: COMPLETED | OUTPATIENT
Start: 2024-08-13 | End: 2024-08-13

## 2024-08-13 RX ORDER — LIDOCAINE HYDROCHLORIDE 20 MG/ML
INJECTION, SOLUTION EPIDURAL; INFILTRATION; INTRACAUDAL; PERINEURAL
Status: COMPLETED | OUTPATIENT
Start: 2024-08-13 | End: 2024-08-13

## 2024-08-13 RX ORDER — TRAMADOL HYDROCHLORIDE 50 MG/1
50 TABLET ORAL EVERY 4 HOURS PRN
Qty: 18 TABLET | Refills: 0 | Status: SHIPPED | OUTPATIENT
Start: 2024-08-13 | End: 2024-08-16

## 2024-08-13 RX ORDER — SODIUM CHLORIDE 0.9 % (FLUSH) 0.9 %
5-40 SYRINGE (ML) INJECTION PRN
Status: DISCONTINUED | OUTPATIENT
Start: 2024-08-13 | End: 2024-08-13 | Stop reason: HOSPADM

## 2024-08-13 RX ORDER — PHENYLEPHRINE HYDROCHLORIDE 10 MG/ML
INJECTION INTRAVENOUS PRN
Status: DISCONTINUED | OUTPATIENT
Start: 2024-08-13 | End: 2024-08-13 | Stop reason: SDUPTHER

## 2024-08-13 RX ORDER — LIDOCAINE HYDROCHLORIDE 10 MG/ML
1 INJECTION, SOLUTION EPIDURAL; INFILTRATION; INTRACAUDAL; PERINEURAL
Status: DISCONTINUED | OUTPATIENT
Start: 2024-08-13 | End: 2024-08-13 | Stop reason: HOSPADM

## 2024-08-13 RX ORDER — ROCURONIUM BROMIDE 10 MG/ML
INJECTION, SOLUTION INTRAVENOUS PRN
Status: DISCONTINUED | OUTPATIENT
Start: 2024-08-13 | End: 2024-08-13 | Stop reason: SDUPTHER

## 2024-08-13 RX ORDER — LEVOTHYROXINE SODIUM 0.03 MG/1
25 TABLET ORAL
Status: DISCONTINUED | OUTPATIENT
Start: 2024-08-14 | End: 2024-08-14 | Stop reason: HOSPADM

## 2024-08-13 RX ORDER — CLINDAMYCIN PHOSPHATE 600 MG/50ML
600 INJECTION, SOLUTION INTRAVENOUS ONCE
Status: COMPLETED | OUTPATIENT
Start: 2024-08-13 | End: 2024-08-13

## 2024-08-13 RX ORDER — ROPIVACAINE HYDROCHLORIDE 5 MG/ML
20 INJECTION, SOLUTION EPIDURAL; INFILTRATION; PERINEURAL ONCE
Status: DISCONTINUED | OUTPATIENT
Start: 2024-08-13 | End: 2024-08-13 | Stop reason: HOSPADM

## 2024-08-13 RX ORDER — LIDOCAINE HYDROCHLORIDE 20 MG/ML
INJECTION, SOLUTION EPIDURAL; INFILTRATION; INTRACAUDAL; PERINEURAL PRN
Status: DISCONTINUED | OUTPATIENT
Start: 2024-08-13 | End: 2024-08-13 | Stop reason: SDUPTHER

## 2024-08-13 RX ORDER — ACETAMINOPHEN 325 MG/1
650 TABLET ORAL EVERY 6 HOURS
Status: DISCONTINUED | OUTPATIENT
Start: 2024-08-13 | End: 2024-08-14 | Stop reason: HOSPADM

## 2024-08-13 RX ORDER — BISACODYL 5 MG/1
5 TABLET, DELAYED RELEASE ORAL DAILY
Status: DISCONTINUED | OUTPATIENT
Start: 2024-08-13 | End: 2024-08-14 | Stop reason: HOSPADM

## 2024-08-13 RX ORDER — ONDANSETRON 4 MG/1
4 TABLET, ORALLY DISINTEGRATING ORAL EVERY 8 HOURS PRN
Status: DISCONTINUED | OUTPATIENT
Start: 2024-08-13 | End: 2024-08-14 | Stop reason: HOSPADM

## 2024-08-13 RX ORDER — FENTANYL CITRATE 50 UG/ML
INJECTION, SOLUTION INTRAMUSCULAR; INTRAVENOUS PRN
Status: DISCONTINUED | OUTPATIENT
Start: 2024-08-13 | End: 2024-08-13 | Stop reason: SDUPTHER

## 2024-08-13 RX ORDER — ONDANSETRON 2 MG/ML
4 INJECTION INTRAMUSCULAR; INTRAVENOUS EVERY 6 HOURS PRN
Status: DISCONTINUED | OUTPATIENT
Start: 2024-08-13 | End: 2024-08-14 | Stop reason: HOSPADM

## 2024-08-13 RX ORDER — MIDAZOLAM HYDROCHLORIDE 1 MG/ML
2 INJECTION INTRAMUSCULAR; INTRAVENOUS ONCE
Status: COMPLETED | OUTPATIENT
Start: 2024-08-13 | End: 2024-08-13

## 2024-08-13 RX ORDER — DIPHENHYDRAMINE HYDROCHLORIDE 50 MG/ML
12.5 INJECTION INTRAMUSCULAR; INTRAVENOUS
Status: DISCONTINUED | OUTPATIENT
Start: 2024-08-13 | End: 2024-08-13 | Stop reason: HOSPADM

## 2024-08-13 RX ORDER — GLYCOPYRROLATE 0.2 MG/ML
INJECTION INTRAMUSCULAR; INTRAVENOUS PRN
Status: DISCONTINUED | OUTPATIENT
Start: 2024-08-13 | End: 2024-08-13 | Stop reason: SDUPTHER

## 2024-08-13 RX ORDER — SODIUM CHLORIDE AND POTASSIUM CHLORIDE 150; 900 MG/100ML; MG/100ML
INJECTION, SOLUTION INTRAVENOUS CONTINUOUS
Status: DISCONTINUED | OUTPATIENT
Start: 2024-08-13 | End: 2024-08-14 | Stop reason: HOSPADM

## 2024-08-13 RX ORDER — SODIUM CHLORIDE 9 MG/ML
INJECTION, SOLUTION INTRAVENOUS PRN
Status: DISCONTINUED | OUTPATIENT
Start: 2024-08-13 | End: 2024-08-13 | Stop reason: HOSPADM

## 2024-08-13 RX ORDER — CARVEDILOL 25 MG/1
25 TABLET ORAL 2 TIMES DAILY WITH MEALS
Status: DISCONTINUED | OUTPATIENT
Start: 2024-08-13 | End: 2024-08-14 | Stop reason: HOSPADM

## 2024-08-13 RX ORDER — INSULIN LISPRO 100 [IU]/ML
10 INJECTION, SOLUTION INTRAVENOUS; SUBCUTANEOUS ONCE
Status: COMPLETED | OUTPATIENT
Start: 2024-08-13 | End: 2024-08-13

## 2024-08-13 RX ORDER — ONDANSETRON 2 MG/ML
4 INJECTION INTRAMUSCULAR; INTRAVENOUS
Status: DISCONTINUED | OUTPATIENT
Start: 2024-08-13 | End: 2024-08-13 | Stop reason: HOSPADM

## 2024-08-13 RX ORDER — SODIUM CHLORIDE 9 MG/ML
INJECTION, SOLUTION INTRAVENOUS CONTINUOUS
Status: DISCONTINUED | OUTPATIENT
Start: 2024-08-13 | End: 2024-08-13 | Stop reason: HOSPADM

## 2024-08-13 RX ORDER — SODIUM CHLORIDE 0.9 % (FLUSH) 0.9 %
5-40 SYRINGE (ML) INJECTION EVERY 12 HOURS SCHEDULED
Status: DISCONTINUED | OUTPATIENT
Start: 2024-08-13 | End: 2024-08-14 | Stop reason: HOSPADM

## 2024-08-13 RX ORDER — TRAMADOL HYDROCHLORIDE 50 MG/1
50 TABLET ORAL EVERY 6 HOURS PRN
Status: DISCONTINUED | OUTPATIENT
Start: 2024-08-13 | End: 2024-08-14 | Stop reason: HOSPADM

## 2024-08-13 RX ORDER — ROPIVACAINE HYDROCHLORIDE 2 MG/ML
INJECTION, SOLUTION EPIDURAL; INFILTRATION; PERINEURAL
Status: COMPLETED | OUTPATIENT
Start: 2024-08-13 | End: 2024-08-13

## 2024-08-13 RX ORDER — EPHEDRINE SULFATE/0.9% NACL/PF 50 MG/5 ML
SYRINGE (ML) INTRAVENOUS PRN
Status: DISCONTINUED | OUTPATIENT
Start: 2024-08-13 | End: 2024-08-13 | Stop reason: SDUPTHER

## 2024-08-13 RX ORDER — DEXAMETHASONE SODIUM PHOSPHATE 4 MG/ML
4 INJECTION, SOLUTION INTRA-ARTICULAR; INTRALESIONAL; INTRAMUSCULAR; INTRAVENOUS; SOFT TISSUE ONCE
Status: DISCONTINUED | OUTPATIENT
Start: 2024-08-13 | End: 2024-08-13 | Stop reason: HOSPADM

## 2024-08-13 RX ORDER — ROPIVACAINE HYDROCHLORIDE 5 MG/ML
INJECTION, SOLUTION EPIDURAL; INFILTRATION; PERINEURAL
Status: COMPLETED | OUTPATIENT
Start: 2024-08-13 | End: 2024-08-13

## 2024-08-13 RX ORDER — ONDANSETRON 2 MG/ML
INJECTION INTRAMUSCULAR; INTRAVENOUS PRN
Status: DISCONTINUED | OUTPATIENT
Start: 2024-08-13 | End: 2024-08-13 | Stop reason: SDUPTHER

## 2024-08-13 RX ADMIN — FENTANYL CITRATE 50 MCG: 50 INJECTION, SOLUTION INTRAMUSCULAR; INTRAVENOUS at 11:42

## 2024-08-13 RX ADMIN — ACETAMINOPHEN 650 MG: 325 TABLET ORAL at 16:24

## 2024-08-13 RX ADMIN — Medication 5 MG: at 12:46

## 2024-08-13 RX ADMIN — MIDAZOLAM 1 MG: 1 INJECTION INTRAMUSCULAR; INTRAVENOUS at 09:57

## 2024-08-13 RX ADMIN — SUGAMMADEX 200 MG: 100 INJECTION, SOLUTION INTRAVENOUS at 13:44

## 2024-08-13 RX ADMIN — CARVEDILOL 25 MG: 25 TABLET, FILM COATED ORAL at 16:25

## 2024-08-13 RX ADMIN — ROPIVACAINE HYDROCHLORIDE 20 ML: 2 INJECTION, SOLUTION EPIDURAL; INFILTRATION at 10:25

## 2024-08-13 RX ADMIN — PROPOFOL 200 MG: 10 INJECTION, EMULSION INTRAVENOUS at 11:43

## 2024-08-13 RX ADMIN — LIDOCAINE HYDROCHLORIDE 20 ML: 20 INJECTION, SOLUTION EPIDURAL; INFILTRATION; INTRACAUDAL; PERINEURAL at 10:25

## 2024-08-13 RX ADMIN — HYDROMORPHONE HYDROCHLORIDE 0.5 MG: 1 INJECTION, SOLUTION INTRAMUSCULAR; INTRAVENOUS; SUBCUTANEOUS at 15:12

## 2024-08-13 RX ADMIN — TRAMADOL HYDROCHLORIDE 100 MG: 50 TABLET ORAL at 16:25

## 2024-08-13 RX ADMIN — GLYCOPYRROLATE 0.2 MG: 0.2 INJECTION INTRAMUSCULAR; INTRAVENOUS at 12:01

## 2024-08-13 RX ADMIN — INSULIN LISPRO 10 UNITS: 100 INJECTION, SOLUTION INTRAVENOUS; SUBCUTANEOUS at 12:12

## 2024-08-13 RX ADMIN — ONDANSETRON 4 MG: 2 INJECTION INTRAMUSCULAR; INTRAVENOUS at 13:42

## 2024-08-13 RX ADMIN — Medication 5 MG: at 13:32

## 2024-08-13 RX ADMIN — POTASSIUM CHLORIDE AND SODIUM CHLORIDE: 900; 150 INJECTION, SOLUTION INTRAVENOUS at 16:24

## 2024-08-13 RX ADMIN — GABAPENTIN 100 MG: 100 CAPSULE ORAL at 09:13

## 2024-08-13 RX ADMIN — FENTANYL CITRATE 50 MCG: 50 INJECTION, SOLUTION INTRAMUSCULAR; INTRAVENOUS at 12:15

## 2024-08-13 RX ADMIN — PHENYLEPHRINE HYDROCHLORIDE 200 MCG: 10 INJECTION INTRAVENOUS at 12:08

## 2024-08-13 RX ADMIN — LIDOCAINE HYDROCHLORIDE 100 MG: 20 INJECTION, SOLUTION EPIDURAL; INFILTRATION; INTRACAUDAL; PERINEURAL at 11:42

## 2024-08-13 RX ADMIN — CLINDAMYCIN PHOSPHATE 600 MG: 600 INJECTION, SOLUTION INTRAVENOUS at 11:53

## 2024-08-13 RX ADMIN — Medication 10 MG: at 12:03

## 2024-08-13 RX ADMIN — ACETAMINOPHEN 650 MG: 325 TABLET ORAL at 22:11

## 2024-08-13 RX ADMIN — PHENYLEPHRINE HYDROCHLORIDE 100 MCG: 10 INJECTION INTRAVENOUS at 13:30

## 2024-08-13 RX ADMIN — SODIUM CHLORIDE: 9 INJECTION, SOLUTION INTRAVENOUS at 09:30

## 2024-08-13 RX ADMIN — AMLODIPINE BESYLATE 5 MG: 5 TABLET ORAL at 16:25

## 2024-08-13 RX ADMIN — DEXAMETHASONE SODIUM PHOSPHATE 4 MG: 4 INJECTION INTRA-ARTICULAR; INTRALESIONAL; INTRAMUSCULAR; INTRAVENOUS; SOFT TISSUE at 12:05

## 2024-08-13 RX ADMIN — BISACODYL 5 MG: 5 TABLET, COATED ORAL at 16:24

## 2024-08-13 RX ADMIN — ROPIVACAINE HYDROCHLORIDE 20 ML: 5 INJECTION, SOLUTION EPIDURAL; INFILTRATION; PERINEURAL at 10:25

## 2024-08-13 RX ADMIN — DIPHENHYDRAMINE HYDROCHLORIDE 6.25 MG: 50 INJECTION INTRAMUSCULAR; INTRAVENOUS at 12:10

## 2024-08-13 RX ADMIN — Medication 5 MG: at 13:09

## 2024-08-13 RX ADMIN — ROCURONIUM BROMIDE 50 MG: 10 INJECTION, SOLUTION INTRAVENOUS at 11:45

## 2024-08-13 ASSESSMENT — PAIN SCALES - GENERAL
PAINLEVEL_OUTOF10: 8
PAINLEVEL_OUTOF10: 7
PAINLEVEL_OUTOF10: 3

## 2024-08-13 ASSESSMENT — PAIN DESCRIPTION - ORIENTATION
ORIENTATION: RIGHT;LEFT

## 2024-08-13 ASSESSMENT — PAIN - FUNCTIONAL ASSESSMENT
PAIN_FUNCTIONAL_ASSESSMENT: 0-10
PAIN_FUNCTIONAL_ASSESSMENT: ACTIVITIES ARE NOT PREVENTED
PAIN_FUNCTIONAL_ASSESSMENT: 0-10

## 2024-08-13 ASSESSMENT — PAIN DESCRIPTION - LOCATION
LOCATION: BREAST

## 2024-08-13 ASSESSMENT — PAIN DESCRIPTION - DESCRIPTORS: DESCRIPTORS: ACHING

## 2024-08-13 NOTE — ANESTHESIA POSTPROCEDURE EVALUATION
Department of Anesthesiology  Postprocedure Note    Patient: Lucero Rose  MRN: [id number]  YOB: 1955  Date of evaluation: 8/13/2024    Procedure Summary       Date: 08/13/24 Room / Location: [address]    Anesthesia Start: 1134 Anesthesia Stop: 1425    Procedure: SIMPLE BREAST MASTECTOMY BILATERAL, PECTORAL BLOCK (Bilateral: Breast) Diagnosis:       History of left breast cancer      Breast discharge      (History of left breast cancer [Z85.3])      (Breast discharge [N64.52])    Surgeons: Michaela Saldana MD Responsible Provider: Leann Lambert MD    Anesthesia Type: general ASA Status: 3            Anesthesia Type: No value filed.    Narcisa Phase I: Narcisa Score: 8    Narcisa Phase II:      Anesthesia Post Evaluation    Comments: POST- ANESTHESIA EVALUATION       Pt Name: Lucero Rose  MRN: [id number]  YOB: 1955  Date of evaluation: 8/13/2024  Time:  3:37 PM      BP (!) 163/66   Pulse 65   Temp 98.2 °F (36.8 °C) (Infrared)   Resp 16   Ht 1.651 m (5' 5\")   Wt 104.3 kg (230 lb)   SpO2 97%   BMI 38.27 kg/m²      Consciousness Level  Awake  Cardiopulmonary Status  Stable  Pain Adequately Treated YES  Nausea / Vomiting  NO  Adequate Hydration  YES  Anesthesia Related Complications NONE      Electronically signed by Leann Lambert MD on 8/13/2024 at 3:37 PM           No notable events documented.

## 2024-08-13 NOTE — INTERVAL H&P NOTE
Update History & Physical    The patient's History and Physical of July 30, 2024 was reviewed with the patient and I examined the patient. There was no change. The surgical site was confirmed by the patient and me.     Pt undergoing Partial mastectomy excision breast Left per Dr. Saldana  for Ductal carcinoma in situ of left breast   Pt c/o tenderness left breast  Pt denies fever/chills, chest pain or SOB  Pt NPO since MN, Coreg, Norvasc, Synthroid am medication today  Pt denies hx MRSA  H/o pulmonary embolism 20 years ago  Anticoagulation None  Denies personal hx and family hx of complications with anesthesia  Difficulty lying flat  Physical exam remains unchanged including cardiac and pulmonary assessment  See nursing flow sheet for vital signs     Electronically signed by NICOLÁS Slade CNP on 8/13/2024 at 8:32 AM

## 2024-08-13 NOTE — ANESTHESIA PROCEDURE NOTES
Peripheral Block    Patient location during procedure: pre-op  Reason for block: post-op pain management and at surgeon's request  Start time: 8/13/2024 10:25 AM  End time: 8/13/2024 10:45 AM  Staffing  Performed: anesthesiologist   Anesthesiologist: Tye Allen MD  Other anesthesia staff: Leann Lambert MD  Performed by: Leann Lambert MD  Authorized by: Leann Lambert MD    Preanesthetic Checklist  Completed: patient identified, IV checked, site marked, risks and benefits discussed, surgical/procedural consents, equipment checked, pre-op evaluation, timeout performed, anesthesia consent given, oxygen available, monitors applied/VS acknowledged, fire risk safety assessment completed and verbalized and blood product R/B/A discussed and consented  Peripheral Block   Patient position: supine  Prep: ChloraPrep  Provider prep: mask and sterile gloves  Patient monitoring: cardiac monitor, continuous pulse ox, frequent blood pressure checks, IV access, oxygen and responsive to questions  Block type: PECS I and PECS II  Laterality: bilateral  Injection technique: single-shot  Guidance: ultrasound guided  Local infiltration: lidocaine  Infiltration strength: 1 %  Local infiltration: lidocaine  Dose: 5 mL    Needle   Needle type: insulated echogenic nerve stimulator needle   Needle gauge: 20 G  Needle localization: ultrasound guidance  Test dose: negative  Needle length: 10 cm  Assessment   Injection assessment: negative aspiration for heme, no paresthesia on injection, local visualized surrounding nerve on ultrasound and no intravascular symptoms  Paresthesia pain: none  Slow fractionated injection: yes  Hemodynamics: stable  Outcomes: patient tolerated procedure well    Additional Notes  Lidocaine 2% with Epi 1: 200,000  Medications Administered  ropivacaine (NAROPIN) injection 0.5% - Perineural   20 mL - 8/13/2024 10:25:00 AM  ropivacaine (NAROPIN) injection 0.2% - Perineural   20 mL - 8/13/2024 10:25:00

## 2024-08-13 NOTE — ANESTHESIA PRE PROCEDURE
Department of Anesthesiology  Preprocedure Note       Name:  Lucero Rose   Age:  69 y.o.  :  1955                                          MRN:  [id number]         Date:  2024      Surgeon: Surgeon(s):  Michaela Saldana MD    Procedure: Procedure(s):  SIMPLE BREAST MASTECTOMY BILATERAL, PECTORAL BLOCK    Medications prior to admission:   Prior to Admission medications    Medication Sig Start Date End Date Taking? Authorizing Provider   pitavastatin (LIVALO) 4 MG TABS tablet Take 1 tablet by mouth nightly 24  Yes Lauren Miller MD   amLODIPine (NORVASC) 5 MG tablet take 1 tablet by mouth once daily 6/10/24  Yes Lauren Miller MD   carvedilol (COREG) 25 MG tablet take 1 tablet by mouth twice a day with meals 6/10/24  Yes Lauren Miller MD   levothyroxine (SYNTHROID) 25 MCG tablet take 1 tablet by mouth every morning ON AN EMPTY STOMACH 24  Yes Lauren Miller MD   spironolactone (ALDACTONE) 50 MG tablet Take 1 tablet by mouth daily 23  Yes Maksim Baltazar MD   insulin lispro, 0.5 Unit Dial, 100 UNIT/ML SOPN Inject into the skin Sliding scale  Humalog 20  Yes Maksim Baltazar MD   vitamin D (CHOLECALCIFEROL) 25 MCG (1000 UT) TABS tablet Take 1 tablet by mouth daily 21  Yes Maksim Baltazar MD   vitamin B-12 (CYANOCOBALAMIN) 1000 MCG tablet take 1 tablet by mouth once daily 22  Yes Bryn Mccann MD   insulin glargine (LANTUS) 100 UNIT/ML injection vial Inject 90 Units into the skin 2 times daily   Yes Maksim Baltazar MD   lactobacillus (CULTURELLE) capsule Take 1 capsule by mouth daily  Patient not taking: Reported on 2024 7/3/24   Anamaria Best DO   JARDIANCE 25 MG tablet Take 1 tablet by mouth every morning    Maksim Baltazar MD   ONETOUCH ULTRA strip TEST ONCE DAILY AS NEEDED FOR HIGH BLOOD SUGAR  FOR SENSOR FAILURE 24   Maksim Baltazar MD   DROPLET PEN NEEDLES 32G X 4 MM MISC USE UP TO 5 TIMES DAILY 24   Maksim Baltazar MD

## 2024-08-14 VITALS
TEMPERATURE: 98.1 F | DIASTOLIC BLOOD PRESSURE: 51 MMHG | HEIGHT: 65 IN | HEART RATE: 55 BPM | RESPIRATION RATE: 16 BRPM | BODY MASS INDEX: 38.32 KG/M2 | WEIGHT: 230 LBS | SYSTOLIC BLOOD PRESSURE: 133 MMHG | OXYGEN SATURATION: 95 %

## 2024-08-14 PROCEDURE — 6370000000 HC RX 637 (ALT 250 FOR IP): Performed by: SURGERY

## 2024-08-14 RX ADMIN — LEVOTHYROXINE SODIUM 25 MCG: 0.03 TABLET ORAL at 06:13

## 2024-08-14 RX ADMIN — ACETAMINOPHEN 650 MG: 325 TABLET ORAL at 06:13

## 2024-08-14 RX ADMIN — CARVEDILOL 25 MG: 25 TABLET, FILM COATED ORAL at 08:10

## 2024-08-14 RX ADMIN — AMLODIPINE BESYLATE 5 MG: 5 TABLET ORAL at 08:10

## 2024-08-14 NOTE — DISCHARGE SUMMARY
Discharge Summary    Date: 8/14/2024  Patient Name: Lucero Rose    YOB: 1955     Age: 69 y.o.    Admit Date: 8/13/2024  Discharge Date: 8/14/2024  Discharge Condition: Good    Admission Diagnosis  History of left breast cancer [Z85.3];Breast discharge [N64.52];Ductal carcinoma in situ (DCIS) of left breast [D05.12]      Discharge Diagnosis  Principal Problem:    Ductal carcinoma in situ (DCIS) of left breast  Resolved Problems:    * No resolved hospital problems. *      Hospital Stay  Narrative of Hospital Course:  Uneventful.      Consultants:  None    Surgeries/procedures Performed:  Bilateral mastectomies     Treatments:    Analgesia, IV Hydration and Surgery    Other and Acetaminophen    Discharge Plan/Disposition:  Home    Hospital/Incidental Findings Requiring Follow Up:    Patient Instructions:    Diet: Diabetic Diet    Activity:Ambulate in House, No Lifting, Driving or Strenuous Excercise and No Driving While on Analgesics  For number of days (if applicable): 14      Other Instructions: See discharge instructions    Provider Follow-Up:   No follow-ups on file.     Significant Diagnostic Studies:    Recent Labs:  Admission on 08/13/2024  POC Glucose                                   Date: 08/13/2024  Value: 308 (H)     Ref range: 65 - 105 mg/dL     Status: Final  POC Glucose                                   Date: 08/13/2024  Value: 280 (H)     Ref range: 65 - 105 mg/dL     Status: Final  POC Glucose                                   Date: 08/13/2024  Value: 275 (H)     Ref range: 65 - 105 mg/dL     Status: Final  ------------    Radiology last 7 days:  No results found.     Pending Labs     Order Current Status    Surgical Pathology Collected (08/13/24 1227)    Surgical Pathology Collected (08/13/24 1304)        Discharge Medications    Current Discharge Medication List    START taking these medications    traMADol (ULTRAM) 50 MG tablet  Take 1 tablet by mouth every 4 hours as needed for  Pain for up to 3 days. Intended supply: 3 days. Take lowest dose possible to manage pain Max Daily Amount: 300 mg  Qty: 18 tablet Refills: 0  Comments: Reduce doses taken as pain becomes manageable  Associated Diagnoses:Ductal carcinoma in situ (DCIS) of left breast          Current Discharge Medication List        Current Discharge Medication List    CONTINUE these medications which have NOT CHANGED    pitavastatin (LIVALO) 4 MG TABS tablet  Take 1 tablet by mouth nightly  Qty: 90 tablet Refills: 0  Associated Diagnoses:Hyperlipidemia, unspecified hyperlipidemia type    amLODIPine (NORVASC) 5 MG tablet  take 1 tablet by mouth once daily  Qty: 90 tablet Refills: 0  Associated Diagnoses:Primary hypertension    carvedilol (COREG) 25 MG tablet  take 1 tablet by mouth twice a day with meals  Qty: 180 tablet Refills: 0  Associated Diagnoses:Primary hypertension    levothyroxine (SYNTHROID) 25 MCG tablet  take 1 tablet by mouth every morning ON AN EMPTY STOMACH  Qty: 90 tablet Refills: 0  Associated Diagnoses:Hypothyroidism, unspecified type    EPINEPHrine (EPIPEN) 0.3 MG/0.3ML SOAJ injection  Inject 0.3 mLs into the muscle as needed (allegic reaction) Use as directed for allergic reaction  Qty: 2 each Refills: 0  Associated Diagnoses:Multiple allergies    spironolactone (ALDACTONE) 50 MG tablet  Take 1 tablet by mouth daily    insulin lispro, 0.5 Unit Dial, 100 UNIT/ML SOPN  Inject into the skin Sliding scale  Humalog    vitamin D (CHOLECALCIFEROL) 25 MCG (1000 UT) TABS tablet  Take 1 tablet by mouth daily    vitamin B-12 (CYANOCOBALAMIN) 1000 MCG tablet  take 1 tablet by mouth once daily  Qty: 90 tablet Refills: 3  Associated Diagnoses:Vitamin B12 deficiency    insulin glargine (LANTUS) 100 UNIT/ML injection vial  Inject 90 Units into the skin 2 times daily    lactobacillus (CULTURELLE) capsule  Take 1 capsule by mouth daily  Qty: 30 capsule Refills: 1    JARDIANCE 25 MG tablet  Take 1 tablet by mouth every

## 2024-08-14 NOTE — OP NOTE
Operative Note      Patient: Lucero Rose  YOB: 1955  MRN: [id number]    Date of Procedure: 8/13/2024    Pre-Op Diagnosis Codes:      * History of left breast cancer [Z85.3]     * Breast discharge [N64.52]    Post-Op Diagnosis:  left DCIS; right nipple discharge       Procedure(s):  SIMPLE BREAST MASTECTOMY BILATERAL, PECTORAL BLOCK    Surgeon(s):  Michaela Saldana MD    Assistant:   First Assistant: Yissel Vargas    Anesthesia: General    Estimated Blood Loss (mL): 200     Complications: None    Specimens:   ID Type Source Tests Collected by Time Destination   A : right breast: short stitch superior, long stitch lateral Tissue Breast SURGICAL PATHOLOGY Michaela Saldana MD 8/13/2024 1227    B : Lt breast, short stitch marks superior, long stitch marks lateral Tissue Breast SURGICAL PATHOLOGY Michaela Saldana MD 8/13/2024 1304        Implants:  * No implants in log *      Drains:   Closed/Suction Drain Right Breast Bulb (Active)   Site Description Unable to view 08/13/24 2038   Dressing Status Clean, dry & intact 08/13/24 2038   Drainage Appearance Serosanguinous 08/13/24 2038   Drain Status Compressed;To bulb suction 08/13/24 2038   Output (ml) 50 ml 08/14/24 0624       Closed/Suction Drain Left Breast Bulb (Active)   Site Description Unable to view 08/13/24 2038   Dressing Status Clean, dry & intact 08/13/24 2038   Drainage Appearance Serosanguinous 08/13/24 2038   Drain Status To bulb suction;Compressed 08/13/24 2038   Output (ml) 25 ml 08/14/24 0624       Findings:  Infection Present At Time Of Surgery (PATOS) (choose all levels that have infection present):  No infection present  Other Findings: none         Detailed Description of Procedure:   The patient was found to have DCIS after having central duct excision for left nipple discharge.  The patient was previously scheduled for lumpectomy reexcision because her margins were inadequate for the diagnosis.  The patient recently started having nipple  discharge from the right breast.  She felt it was similar to what happened with the left breast.  At that point, the patient made the personal decision to proceed with bilateral mastectomies instead of potentially having to deal with multiple surgeries.  She provided informed consent for the procedures and was taken to the operating room.    The patient was placed on the OR table in supine position with padding of all bony prominences.  Anesthesia was induced.  The patient was prepped and draped in a sterile fashion.  A proper timeout was taken.     The right mastectomy was performed by making an elliptical incision to include the nipple areolar complex. Using the electrocautery, flaps were raised superiorly to just inferior to the clavicle and including the axillary tail, medially to just lateral to the sternum's edge, inferiorly to the inframammary fold, and laterally to just anterior to the latissimus dorsi. The breast and its fascia was removed from the pectoralis major muscle using electrocautery. The breast was marked with a short stitch superior and long stitch lateral.  Any bleeding encountered was controlled with cautery, clips, or ties.  The flaps were checked for consistency and any thickened tissue was trimmed with curved barrow scissors and submitted unoriented with the mastectomy specimen. A 19 Uzbek katherine drain was placed through a separate stab incision in the anterior axillary line of the inferior flap and secured with a 2-0 nylon.      The left mastectomy was performed in the same fashion.    The wounds was closed with 3-0 vicryl buried interrupted sutures followed by a 4-0 monocryl running subcuticular suture for the skin edge.  Mastisol and steri-strips were applied, followed by gauze, and ABD pads. Kerlex was fluffed and placed on the chest wall in the bra that was placed on the patient.     Patient was awakened and returned to recovery in stable condition.  Needle, sponge, and instrument counts

## 2024-08-14 NOTE — DISCHARGE INSTRUCTIONS
Regarding the wound:  1 ) keep the dressing on for two days and keep it dry.   2 ) You can shower after you remove the dressing. There are paper tapes on your incisions called steri-strips. Do not scrub the wound. Let the soap and water run over the incision and pat it dry.  3 ) You do not need to wear a dressing or bra unless you want to.    Regarding the drain:  1)  empty the drain once or twice a day and record the amount that comes out in milliliters (ml) or cc (cubic centimeters).  The amount for 24 hours is needed daily.  2)  To empty the drain, hold the bulb upright. Remove the little cap. Tip the bulb over the container to record the output and squeeze out the contents.  While it is still squeezed, turn it upright and put the cap back on.  The drain works by the bulb being squeezed.   3)  You can shower with the drain in place.  Place a long string of some sort (shoe string, twine) through the plastic loop hanging off of the bulb. Then you can hang it around your neck.  Don't let the drain hang loose or drag. It will hurt.  4)  Place a clean dry gauze over the drain as it exits the skin.  5)  You can place the drain bulb in your pocket of your clothing or pin it to your clothing. Again, you don't want it to hang.  6)  Your drainage will be a dark brown/black initially. It will eventually turn more red and then watery red.       Regarding pain management:  1 ) You have tramadol ordered for you.  Take it as needed for pain not managed by the scheduled tylenol.  2 ) I recommend you can take tylenol (2 regular or 1 extra strength) every 6 hours with food for the first 2-3 days to keep your pain level down.    3 ) You can apply ice/cold compresses for 20-30 minutes at a time as often as you like to help with pain.      Regarding activity:  1 ) take it easy and limit activity with your arm on the surgical side.  2 ) No heavy lifting pushing or pulling greater than 10 pounds.  3 ) please rest with arms on pillows

## 2024-08-14 NOTE — CARE COORDINATION
Case Management Assessment  Initial Evaluation    Date/Time of Evaluation: 8/14/2024 9:21 AM  Assessment Completed by: Ewelina Charles RN    If patient is discharged prior to next notation, then this note serves as note for discharge by case management.    Patient Name: Lucero Rose                   YOB: 1955  Diagnosis: History of left breast cancer [Z85.3]  Breast discharge [N64.52]  Ductal carcinoma in situ (DCIS) of left breast [D05.12]                   Date / Time: 8/13/2024  8:28 AM    Patient Admission Status: Outpatient in a bed   Readmission Risk (Low < 19, Mod (19-27), High > 27): No data recorded  Current PCP: Lauren Miller MD  PCP verified by ? Yes    Chart Reviewed: Yes      History Provided by: Patient  Patient Orientation: Alert and Oriented    Patient Cognition: Alert    Hospitalization in the last 30 days (Readmission):  No    If yes, Readmission Assessment in  Navigator will be completed.    Advance Directives:      Code Status: Full Code   Patient's Primary Decision Maker is: Legal Next of Kin      Discharge Planning:    Patient lives with: Spouse/Significant Other, Children Type of Home: House  Primary Care Giver: Self  Patient Support Systems include: Spouse/Significant Other, Children   Current Financial resources: Medicare  Current community resources: None  Current services prior to admission: Durable Medical Equipment            Current DME: Glucometer            Type of Home Care services:  None    ADLS  Prior functional level: Independent in ADLs/IADLs  Current functional level: Independent in ADLs/IADLs    PT AM-PAC:   /24  OT AM-PAC:   /24    Family can provide assistance at DC: Yes  Would you like Case Management to discuss the discharge plan with any other family members/significant others, and if so, who? No  Plans to Return to Present Housing: Yes  Other Identified Issues/Barriers to RETURNING to current housing: no barriers   Potential Assistance needed at  discharge: N/A            Potential DME:  no  Patient expects to discharge to: House  Plan for transportation at discharge: Family    Financial    Payor: [facility] MEDICARE / Plan: ANTHEM MEDIBLUE ESSENTIAL/PLUS / Product Type: *No Product type* /     Does insurance require precert for SNF: Yes    Potential assistance Purchasing Medications: No  Meds-to-Beds request: Yes      [id number] - [place], [place] - [address] - P [phone] - F [phone]  [address] [address]  Phone: [phone] Fax: [phone]    [facility] [id number] - [facility] [address] - P [phone] - F [phone]  [address] [address]  Phone: [phone] Fax: [phone]      Notes:    Factors facilitating achievement of predicted outcomes: Family support, Friend support, Motivated, Cooperative, and Pleasant    Barriers to discharge: Medical complications    Additional Case Management Notes: 8/14/2024 [employer] MEDICARE from home w/ spouse/son, independent, drives; DME Dexcom; VNS none/declines, POD #1 Bilateral mastectomy; friend/neighbor is RN and will assist as needed; plan is home no needs    The Plan for Transition of Care is related to the following treatment goals of History of left breast cancer [Z85.3]  Breast discharge [N64.52]  Ductal carcinoma in situ (DCIS) of left breast [D05.12]    IF APPLICABLE: The Patient and/or patient representative Lucero and her family were provided with a choice of provider and agrees with the discharge plan. Freedom of choice list with basic dialogue that supports the patient's individualized plan of care/goals and shares the quality data associated with the providers was provided to: Patient     The Patient and/or Patient Representative Agree with the Discharge Plan? Yes    Ewelina Charles RN  Case Management Department  Ph: [phone] Fax: [phone]

## 2024-08-14 NOTE — PROGRESS NOTES
Discharge paperwork reviewed in detail with pt and spouse including pain medication, and education on MANASA drains. Pt verbalized understanding. Pt packed belongings and was wheeled outside w/ spouse.

## 2024-08-14 NOTE — PROGRESS NOTES
POD 1 s/p bilateral mastectomies    S: Mrs. Rose has no complaints today.  No pain.    O:   Vitals:    08/13/24 2017 08/13/24 2354 08/14/24 0404 08/14/24 0819   BP: (!) 145/71 131/61 (!) 154/76 (!) 133/51   Pulse: 53 54 60 55   Resp: 16 16 18 16   Temp: 98.4 °F (36.9 °C) 98.4 °F (36.9 °C) 98 °F (36.7 °C) 98.1 °F (36.7 °C)   TempSrc: Oral Oral Oral Oral   SpO2: 93% 92% 96% 95%   Weight:       Height:            I/O last 3 completed shifts:  In: 900 [I.V.:850; IV Piggyback:50]  Out: 1305 [Urine:900; Drains:205; Blood:200]  No intake/output data recorded.     Bilateral chest walls are flat with good color and no fluctuance. The drains have serosanguinous drainage.    A/P:   POD 1 s/p bilateral mastectomies  The patient is stable for discharge.  She has been instructed on how to manage her drains.  Her pain medication and instructions have been given to her.

## 2024-08-20 LAB — SURGICAL PATHOLOGY REPORT: NORMAL

## 2024-08-26 ENCOUNTER — OFFICE VISIT (OUTPATIENT)
Dept: SURGERY | Age: 69
End: 2024-08-26

## 2024-08-26 VITALS
HEIGHT: 65 IN | RESPIRATION RATE: 18 BRPM | DIASTOLIC BLOOD PRESSURE: 80 MMHG | BODY MASS INDEX: 38.32 KG/M2 | SYSTOLIC BLOOD PRESSURE: 144 MMHG | OXYGEN SATURATION: 97 % | WEIGHT: 230 LBS | HEART RATE: 58 BPM

## 2024-08-26 DIAGNOSIS — D05.12 DUCTAL CARCINOMA IN SITU (DCIS) OF LEFT BREAST: Primary | ICD-10-CM

## 2024-08-26 PROCEDURE — 99024 POSTOP FOLLOW-UP VISIT: CPT | Performed by: SURGERY

## 2024-08-26 ASSESSMENT — ENCOUNTER SYMPTOMS
APNEA: 0
NAUSEA: 0
VOMITING: 0
BACK PAIN: 0
ABDOMINAL PAIN: 0
ABDOMINAL DISTENTION: 0
COUGH: 0
DIARRHEA: 0
BLOOD IN STOOL: 0
CONSTIPATION: 0
ANAL BLEEDING: 0
WHEEZING: 0
COLOR CHANGE: 0
SHORTNESS OF BREATH: 0
STRIDOR: 0
RECTAL PAIN: 0
CHEST TIGHTNESS: 0

## 2024-08-26 NOTE — PROGRESS NOTES
Review of Systems   Constitutional:  Negative for activity change, appetite change, chills, diaphoresis, fatigue, fever and unexpected weight change.   Respiratory:  Negative for apnea, cough, chest tightness, shortness of breath, wheezing and stridor.    Cardiovascular:  Negative for chest pain and leg swelling.   Gastrointestinal:  Negative for abdominal distention, abdominal pain, anal bleeding, blood in stool, constipation, diarrhea, nausea, rectal pain and vomiting.   Genitourinary:  Negative for difficulty urinating, dysuria, enuresis, flank pain, frequency, hematuria and urgency.   Musculoskeletal:  Negative for back pain.   Skin:  Negative for color change and pallor.   Allergic/Immunologic: Negative for food allergies and immunocompromised state.   Neurological:  Negative for syncope, speech difficulty, weakness, light-headedness, numbness and headaches.   Hematological:  Negative for adenopathy. Bruises/bleeds easily.   Psychiatric/Behavioral:  The patient is nervous/anxious.

## 2024-08-26 NOTE — PROGRESS NOTES
Patient's Name/Date of Birth: Lucero Rose / 1955 (69 y.o.)    Date: August 26, 2024     HPI: Pt is a 69 y.o. female who presents to [facility] for a postoperative check following bilateral mastectomies.  She was found to have a left ductal carcinoma in situ and she had a central duct excision.  She opted for bilateral mastectomies because she started to have symptoms on the contralateral breast.  Her pathology returned as residual ductal carcinoma in situ 12 mm intermediate grade with negative margins.  The right breast was negative for any cancers.  She was found to have intraductal papillomas bilaterally.  Her pathology is listed below.    8/13/24 1227    Surgical Pathology Report Path Number: [id number]    -- Diagnosis --  A.  RIGHT BREAST, SIMPLE MASTECTOMY:  -FIBROCYSTIC CHANGES WITH CALCIFICATIONS, USUAL DUCTAL HYPERPLASIA,  DUCT ECTASIA, AND INTRADUCTAL PAPILLOMAS (EXCISED).  -UNREMARKABLE NIPPLE.  -NEGATIVE FOR ATYPIA AND CARCINOMA.    B.  LEFT BREAST, SIMPLE MASTECTOMY:  -RESIDUAL DUCTAL CARCINOMA IN SITU, INTERMEDIATE GRADE, SIZE 12.0 MM,  MARGINS NEGATIVE  -LOBULAR CARCINOMA IN SITU.  -BIOPSY SITE CHANGES.  -UNREMARKABLE NIPPLE.  -BACKGROUND BREAST PARENCHYMA WITH FIBROCYSTIC CHANGE WITH  CALCIFICATIONS, USUAL DUCTAL HYPERPLASIA, INTRADUCTAL PAPILLOMAS  (EXCISED), ADENOSIS WITH CALCIFICATIONS, AND DUCT ECTASIA.    -- Diagnosis Comment --  Biomarkers were performed the patient's previous breast biopsy  ([id number]), with the following results:  ER: Positive (80-90%; strong intensity).  GA: Positive (90%; strong intensity).     The drain output is greater than 30 cc bilaterally.  The patient has no complaints.    Past Medical History:   Diagnosis Date    Anesthesia complication     pt can't lay flat- has trouble breathing due to sinus issues    Arthritis     Cancer (HCC) 10/31/2019    bladder mass and kidney    Current moderate episode of major depressive disorder, unspecified

## 2024-08-26 NOTE — PATIENT INSTRUCTIONS
If your drain output is more than 30 cc per/24 hours then call and reschedule your appointment for the following Monday.

## 2024-09-04 ENCOUNTER — OFFICE VISIT (OUTPATIENT)
Dept: SURGERY | Age: 69
End: 2024-09-04

## 2024-09-04 VITALS
OXYGEN SATURATION: 97 % | HEIGHT: 65 IN | WEIGHT: 226 LBS | SYSTOLIC BLOOD PRESSURE: 170 MMHG | HEART RATE: 60 BPM | DIASTOLIC BLOOD PRESSURE: 82 MMHG | BODY MASS INDEX: 37.65 KG/M2

## 2024-09-04 DIAGNOSIS — Z80.3 FAMILY HISTORY OF BREAST CANCER IN FIRST DEGREE RELATIVE: ICD-10-CM

## 2024-09-04 DIAGNOSIS — D05.12 DUCTAL CARCINOMA IN SITU (DCIS) OF LEFT BREAST: Primary | ICD-10-CM

## 2024-09-04 PROCEDURE — 99024 POSTOP FOLLOW-UP VISIT: CPT | Performed by: SURGERY

## 2024-09-04 NOTE — PROGRESS NOTES
Patient's Name/Date of Birth: Lucero Rose / 1955 (69 y.o.)    Date: September 4, 2024     HPI: Pt is a 69 y.o. female who presents to [facility] for a postoperative check following bilateral mastectomies performed on August 13, 2024.  On her last visit, her drains were not ready to be pulled as the output was too high.  Today, the output is less than 30 cc per side and therefore the drains been pulled.  She has no new complaints today.    Past Medical History:   Diagnosis Date    Anesthesia complication     pt can't lay flat- has trouble breathing due to sinus issues    Arthritis     Cancer (HCC) 10/31/2019    bladder mass and kidney    Current moderate episode of major depressive disorder, unspecified whether recurrent (HCC)     Diabetes mellitus ([facility])     type II    Diverticulitis     Egg allergy     sensitive    Excessive daytime sleepiness     Generalized anxiety disorder     GERD (gastroesophageal reflux disease)     History of left radical nephrectomy     Hx of blood clots     PE years ago    Hyperlipidemia     Hypertension     Insulin use (long-term) in type 2 diabetes ([facility])     Panic disorder     Pneumonia 03/2024    Covid 3/2024    Sleep apnea     has cpap but pt not using at present machine needs tubing    Stress incontinence     Subclinical hyperthyroidism     Wears glasses        Past Surgical History:   Procedure Laterality Date    APPENDECTOMY  in 90's    BREAST SURGERY Left 6/25/2024    BREAST CENTRAL DUCT EXCISION, LEFT performed by Michaela Saldana MD at [facility] OR    CHOLECYSTECTOMY, OPEN  1999    COLONOSCOPY  2016    CYSTOSCOPY  01/03/2017    CYSTOSCOPY  01/20/2017    cysto TURBT    HYSTERECTOMY (CERVIX STATUS UNKNOWN)  1994    KIDNEY REMOVAL  1999    Radical Nephrectomy, left- Dr. Seth    MASTECTOMY Bilateral 8/13/2024    SIMPLE BREAST MASTECTOMY BILATERAL, PECTORAL BLOCK performed by Michaela Saldana MD at [facility] OR    OTHER SURGICAL HISTORY N/A 01/20/2017    TUR-BT

## 2024-09-04 NOTE — PROGRESS NOTES
Review of Systems   Constitutional:  Negative for activity change, appetite change, chills, diaphoresis, fatigue, fever and unexpected weight change.   Respiratory:  Negative for apnea, cough, chest tightness, shortness of breath, wheezing and stridor.    Cardiovascular:  Negative for chest pain and leg swelling.   Gastrointestinal:  Negative for abdominal distention, abdominal pain, anal bleeding, blood in stool, constipation, diarrhea, nausea, rectal pain and vomiting.   Genitourinary:  Negative for difficulty urinating, dysuria, enuresis, flank pain, frequency, hematuria and urgency.   Musculoskeletal:  Negative for back pain.   Skin:  Negative for color change and pallor.   Allergic/Immunologic: Negative for food allergies and immunocompromised state.   Neurological:  Negative for syncope, speech difficulty, weakness, light-headedness, numbness and headaches.   Hematological:  Negative for adenopathy. Does not bruise/bleed easily.   Psychiatric/Behavioral:  The patient is not nervous/anxious.

## 2024-09-09 ENCOUNTER — OFFICE VISIT (OUTPATIENT)
Dept: SURGERY | Age: 69
End: 2024-09-09

## 2024-09-09 VITALS
WEIGHT: 224 LBS | SYSTOLIC BLOOD PRESSURE: 149 MMHG | BODY MASS INDEX: 37.32 KG/M2 | DIASTOLIC BLOOD PRESSURE: 74 MMHG | HEART RATE: 59 BPM | OXYGEN SATURATION: 97 % | HEIGHT: 65 IN

## 2024-09-09 DIAGNOSIS — D05.12 DUCTAL CARCINOMA IN SITU (DCIS) OF LEFT BREAST: Primary | ICD-10-CM

## 2024-09-09 DIAGNOSIS — Z80.3 FAMILY HISTORY OF BREAST CANCER IN FIRST DEGREE RELATIVE: ICD-10-CM

## 2024-09-09 PROCEDURE — 99024 POSTOP FOLLOW-UP VISIT: CPT | Performed by: SURGERY

## 2024-09-09 NOTE — PROGRESS NOTES
Review of Systems   Constitutional:  Negative for activity change, appetite change, chills, diaphoresis, fatigue, fever and unexpected weight change.   Respiratory:  Negative for apnea, cough, chest tightness, shortness of breath, wheezing and stridor.    Cardiovascular:  Negative for chest pain and leg swelling.   Gastrointestinal:  Negative for abdominal distention, abdominal pain, anal bleeding, blood in stool, constipation, diarrhea, nausea, rectal pain and vomiting.   Genitourinary:  Negative for difficulty urinating, dysuria, enuresis, flank pain, frequency, hematuria and urgency.   Musculoskeletal:  Negative for back pain.   Skin:  Negative for color change and pallor.   Allergic/Immunologic: Negative for food allergies and immunocompromised state.   Neurological:  Negative for syncope, speech difficulty, weakness, light-headedness, numbness and headaches.   Hematological:  Negative for adenopathy. Does not bruise/bleed easily.   Psychiatric/Behavioral:  The patient is not nervous/anxious.      
wound.   Neurological:  Negative for syncope, weakness, light-headedness and headaches.   Hematological:  Negative for adenopathy. Does not bruise/bleed easily.   Psychiatric/Behavioral:  Negative for suicidal ideas. The patient is not nervous/anxious.      Physical Exam:  Vitals:    09/09/24 1534   BP: (!) 149/74   Pulse: 59   SpO2: 97%     General:A & O x3  BREAST and AXILLA: The bilateral chest walls are mildly fluctuant.  The right side had 70 cc aspirated and it was more sanguinous than the aspiration on the left.          Assessment/Plan:  1. Ductal carcinoma in situ (DCIS) of left breast    2. Family history of breast cancer in first degree relative      Staging: Tis>> left DCIS     Lucero Rose is a 69 y.o. female that is seen today for a postoperative check following bilateral mastectomies that were performed on August 13, 2024.  She had her drains removed on the last visit and is here for seroma check.    Will have her return to clinic in 1 week for another seroma check.  In the meantime, the patient can do very gentle stretching but should limit her vigorous activity and no heavy lifting, pushing, or pulling greater than 10 pounds.    Return in about 1 week (around 9/16/2024).     Michaela Saldana MD  10/24/2024 8:54 PM

## 2024-09-16 ENCOUNTER — OFFICE VISIT (OUTPATIENT)
Dept: SURGERY | Age: 69
End: 2024-09-16

## 2024-09-16 VITALS
OXYGEN SATURATION: 97 % | BODY MASS INDEX: 37.99 KG/M2 | WEIGHT: 228 LBS | DIASTOLIC BLOOD PRESSURE: 82 MMHG | SYSTOLIC BLOOD PRESSURE: 152 MMHG | HEIGHT: 65 IN | HEART RATE: 60 BPM

## 2024-09-16 DIAGNOSIS — D05.12 DUCTAL CARCINOMA IN SITU (DCIS) OF LEFT BREAST: Primary | ICD-10-CM

## 2024-09-16 PROCEDURE — 99024 POSTOP FOLLOW-UP VISIT: CPT | Performed by: SURGERY

## 2024-09-16 NOTE — PROGRESS NOTES
Patient's Name/Date of Birth: Lucero Rose / 1955 (69 y.o.)    Date: September 16, 2024    HPI: Pt is a 69 y.o. female who presents to [facility] for a postoperative check following bilateral mastectomies there were performed on August 13, 2024.  She had her drains removed a couple weeks ago and is here for a seroma check.  She has no new complaints.    Past Medical History:   Diagnosis Date    Anesthesia complication     pt can't lay flat- has trouble breathing due to sinus issues    Arthritis     Cancer (HCC) 10/31/2019    bladder mass and kidney    Current moderate episode of major depressive disorder, unspecified whether recurrent (HCC)     Diabetes mellitus (HCC)     type II    Diverticulitis     Egg allergy     sensitive    Excessive daytime sleepiness     Generalized anxiety disorder     GERD (gastroesophageal reflux disease)     History of left radical nephrectomy     Hx of blood clots     PE years ago    Hyperlipidemia     Hypertension     Insulin use (long-term) in type 2 diabetes (HCC)     Panic disorder     Pneumonia 03/2024    Covid 3/2024    Sleep apnea     has cpap but pt not using at present machine needs tubing    Stress incontinence     Subclinical hyperthyroidism     Wears glasses        Past Surgical History:   Procedure Laterality Date    APPENDECTOMY  in 90's    BREAST SURGERY Left 6/25/2024    BREAST CENTRAL DUCT EXCISION, LEFT performed by Michaela Saldana MD at [facility] OR    CHOLECYSTECTOMY, OPEN  1999    COLONOSCOPY  2016    CYSTOSCOPY  01/03/2017    CYSTOSCOPY  01/20/2017    cysto TURBT    HYSTERECTOMY (CERVIX STATUS UNKNOWN)  1994    KIDNEY REMOVAL  1999    Radical Nephrectomy, left- Dr. Seth    MASTECTOMY Bilateral 8/13/2024    SIMPLE BREAST MASTECTOMY BILATERAL, PECTORAL BLOCK performed by Michaela Saldana MD at [facility] OR    OTHER SURGICAL HISTORY N/A 01/20/2017    TUR-BT    OVARY REMOVAL      TONSILLECTOMY AND ADENOIDECTOMY      at age 13    TOTAL NEPHRECTOMY Left

## 2024-09-30 ENCOUNTER — OFFICE VISIT (OUTPATIENT)
Dept: SURGERY | Age: 69
End: 2024-09-30

## 2024-09-30 VITALS
SYSTOLIC BLOOD PRESSURE: 161 MMHG | HEIGHT: 65 IN | OXYGEN SATURATION: 97 % | HEART RATE: 60 BPM | DIASTOLIC BLOOD PRESSURE: 81 MMHG | WEIGHT: 229 LBS | BODY MASS INDEX: 38.15 KG/M2

## 2024-09-30 DIAGNOSIS — D05.12 DUCTAL CARCINOMA IN SITU (DCIS) OF LEFT BREAST: Primary | ICD-10-CM

## 2024-09-30 PROCEDURE — 99024 POSTOP FOLLOW-UP VISIT: CPT | Performed by: SURGERY

## 2024-09-30 NOTE — PROGRESS NOTES
Patient's Name/Date of Birth: Lucero Rose / 1955 (69 y.o.)    Date: September 30, 2024    HPI: Pt is a 69 y.o. female who presents to [facility] for a postoperative check following bilateral mastectomies performed on August 13, 2024 for left breast ductal carcinoma in situ.  She had her drains removed a couple weeks ago.  She has no complaints today.  She is inquiring about the ability to get into a pool at this time.    Past Medical History:   Diagnosis Date    Anesthesia complication     pt can't lay flat- has trouble breathing due to sinus issues    Arthritis     Cancer (HCC) 10/31/2019    bladder mass and kidney    Current moderate episode of major depressive disorder, unspecified whether recurrent (HCC)     Diabetes mellitus ([facility])     type II    Diverticulitis     Egg allergy     sensitive    Excessive daytime sleepiness     Generalized anxiety disorder     GERD (gastroesophageal reflux disease)     History of left radical nephrectomy     Hx of blood clots     PE years ago    Hyperlipidemia     Hypertension     Insulin use (long-term) in type 2 diabetes ([facility])     Panic disorder     Pneumonia 03/2024    Covid 3/2024    Sleep apnea     has cpap but pt not using at present machine needs tubing    Stress incontinence     Subclinical hyperthyroidism     Wears glasses        Past Surgical History:   Procedure Laterality Date    APPENDECTOMY  in 90's    BREAST SURGERY Left 6/25/2024    BREAST CENTRAL DUCT EXCISION, LEFT performed by Michaela Saldana MD at [facility] OR    CHOLECYSTECTOMY, OPEN  1999    COLONOSCOPY  2016    CYSTOSCOPY  01/03/2017    CYSTOSCOPY  01/20/2017    cysto TURBT    HYSTERECTOMY (CERVIX STATUS UNKNOWN)  1994    KIDNEY REMOVAL  1999    Radical Nephrectomy, left- Dr. Seth    MASTECTOMY Bilateral 8/13/2024    SIMPLE BREAST MASTECTOMY BILATERAL, PECTORAL BLOCK performed by Michaela Saldana MD at [facility] OR    OTHER SURGICAL HISTORY N/A 01/20/2017    TUR-BT    OVARY REMOVAL

## 2024-10-10 DIAGNOSIS — E03.9 HYPOTHYROIDISM, UNSPECIFIED TYPE: ICD-10-CM

## 2024-10-10 DIAGNOSIS — E78.5 HYPERLIPIDEMIA, UNSPECIFIED HYPERLIPIDEMIA TYPE: ICD-10-CM

## 2024-10-10 RX ORDER — PITAVASTATIN CALCIUM 4.18 MG/1
4 TABLET, FILM COATED ORAL NIGHTLY
Qty: 90 TABLET | Refills: 0 | Status: SHIPPED | OUTPATIENT
Start: 2024-10-10

## 2024-10-10 RX ORDER — LEVOTHYROXINE SODIUM 25 UG/1
25 TABLET ORAL EVERY MORNING
Qty: 90 TABLET | Refills: 0 | Status: SHIPPED | OUTPATIENT
Start: 2024-10-10

## 2024-11-19 NOTE — PROGRESS NOTES
[facility]  [address] [place] [address]  Phone: [phone]  Fax: [phone]      Date of Visit:  2024  Patient Name: Lucero Rose   Patient :  1955     HPI:     Lucero Rose is a 69 y.o. female who presents today to discuss   Chief Complaint   Patient presents with    vaginal itching and burning     Tried vagacil, and wipes, has been going on for months. Nagging pain in right side.    This is a 69 year female with a history of T2DM, HTN, HLD, hypothyroidism, left breast ductal carcinoma in situ (ER/ND positive) status post bilateral mastectomy and acquired absence of right kidney who presents today for an follow up visit for vaginal itching and burning.    Patient continues to complain of vaginal itching and burning.  No fevers, chills, vaginal discharge, dysuria, urinary urgency, or increase in frequency.  Not sexually active.  She has tried over-the-counter Vagisil, aloe lotion, diaper rash cream and unscented cleansing wipes without significant relief.  This has been ongoing for months.  Has not tried vaginal moisturizers or lubricants.  She also endorses has intermittent localized right sided groin pain that is exacerbated by coughing and rolling over in bed.  Denies any bulging with coughing.  Pain described as a pulling sensation only lasts for few seconds but has been happening more frequently in the last 2 weeks.  Tends to sleep on her left side.  Denies any nausea, vomiting, constipation, or diarrhea.    Of note, patient states she had bilateral mastectomy on 2024.  Overall healing well but states she has some extra skin in the axilla that tends to get caught on her bra and is irritating and painful.  Is inquiring whether there is any surgical options to get rid of the excess skin.  I did advise her to touch base with her surgeon, Dr. Saldana, for further evaluation and treatment.    I personally reviewed

## 2024-11-20 ENCOUNTER — OFFICE VISIT (OUTPATIENT)
Age: 69
End: 2024-11-20
Payer: MEDICARE

## 2024-11-20 VITALS
WEIGHT: 226.8 LBS | TEMPERATURE: 98.9 F | BODY MASS INDEX: 37.74 KG/M2 | DIASTOLIC BLOOD PRESSURE: 63 MMHG | SYSTOLIC BLOOD PRESSURE: 150 MMHG | HEART RATE: 63 BPM | RESPIRATION RATE: 12 BRPM

## 2024-11-20 DIAGNOSIS — N95.8 GENITOURINARY SYNDROME OF MENOPAUSE: Primary | ICD-10-CM

## 2024-11-20 PROCEDURE — 99212 OFFICE O/P EST SF 10 MIN: CPT

## 2024-11-20 RX ORDER — DULAGLUTIDE 0.75 MG/.5ML
0.75 INJECTION, SOLUTION SUBCUTANEOUS WEEKLY
COMMUNITY

## 2024-11-20 NOTE — PATIENT INSTRUCTIONS
Thank you for following up with us at [facility] outpatient residency clinic! It was a pleasure to meet you today!     Our plan is the following:    Vaginal Moisturizers  Vaginal moisturizers are used to moisturize the vulva and vagina as dryness in these areas can cause discomfort, irritability, and itchiness. Unlike a lubricant (which is used during vaginal penetration), a vaginal moisturizer is used regularly (daily, every other day, twice weekly, etc based on formulation) to prevent dryness. This is not an all-inclusive list and I do not recommend one brand over another - this is just a short list to get you started on your search for the right vaginal moisturizer for you. Even though some say lubricants, they can be used daily as a moisturizer and not just during sex as a lubricant.   Liquid Silk - This is silky to the touch and does not get sticky. It is a hybrid  (silicone and water based) product and is glycerin-free.  Pink Indulgence Lube - This is very similar to Liquid Silk but it tends to last longer. This is also a hybrid (silicone and water based) product.  Sliquid Organics Silk - This is glycerin-free and paraben-free. It is also a hybrid (aloe & silicone based) product. It contains botanical extracts.  Replens - This is long-lasting moisturizer. It does contain glycerin. It comes with pre-filled disposable applicators to ensure appropriate dosing.    Lubricants  Lubricants can be used to make sex (or any vaginal penetration) less uncomfortable especially after menopause when natural lubrication decreases. Some considerations when looking for a lubricant include desire for pregnancy, allergies, condom or diaphragm use, and personal history of infections like yeast or BV (bacterial vaginosis).  While not well researched, there is a belief that glycerin may cause or increase your risk for yeast infections. There are glycerin-free lubrication products available if this is a concern

## 2025-01-09 DIAGNOSIS — E03.9 HYPOTHYROIDISM, UNSPECIFIED TYPE: ICD-10-CM

## 2025-01-09 DIAGNOSIS — E78.5 HYPERLIPIDEMIA, UNSPECIFIED HYPERLIPIDEMIA TYPE: ICD-10-CM

## 2025-01-10 RX ORDER — LEVOTHYROXINE SODIUM 25 UG/1
25 TABLET ORAL
Qty: 90 TABLET | Refills: 0 | Status: SHIPPED | OUTPATIENT
Start: 2025-01-10

## 2025-01-10 RX ORDER — PITAVASTATIN CALCIUM 4.18 MG/1
1 TABLET, FILM COATED ORAL NIGHTLY
Qty: 90 TABLET | Refills: 0 | Status: SHIPPED | OUTPATIENT
Start: 2025-01-10

## 2025-01-31 DIAGNOSIS — I10 PRIMARY HYPERTENSION: ICD-10-CM

## 2025-01-31 RX ORDER — AMLODIPINE BESYLATE 5 MG/1
5 TABLET ORAL DAILY
Qty: 90 TABLET | Refills: 0 | Status: SHIPPED | OUTPATIENT
Start: 2025-01-31

## 2025-01-31 RX ORDER — CARVEDILOL 25 MG/1
25 TABLET ORAL 2 TIMES DAILY WITH MEALS
Qty: 180 TABLET | Refills: 0 | Status: SHIPPED | OUTPATIENT
Start: 2025-01-31

## 2025-03-25 LAB — HBA1C MFR BLD HPLC: 11 %

## 2025-04-11 DIAGNOSIS — E03.9 HYPOTHYROIDISM, UNSPECIFIED TYPE: ICD-10-CM

## 2025-04-11 RX ORDER — LEVOTHYROXINE SODIUM 25 UG/1
25 TABLET ORAL EVERY MORNING
Qty: 90 TABLET | Refills: 1 | Status: SHIPPED | OUTPATIENT
Start: 2025-04-11

## 2025-04-17 ENCOUNTER — HOSPITAL ENCOUNTER (OUTPATIENT)
Age: 70
Discharge: HOME | End: 2025-04-17
Payer: MEDICARE

## 2025-04-17 ENCOUNTER — OFFICE VISIT (OUTPATIENT)
Age: 70
End: 2025-04-17
Payer: MEDICARE

## 2025-04-17 ENCOUNTER — HOSPITAL ENCOUNTER (OUTPATIENT)
Age: 70
Setting detail: SPECIMEN
Discharge: HOME | End: 2025-04-17

## 2025-04-17 VITALS
HEIGHT: 65 IN | WEIGHT: 229.2 LBS | HEART RATE: 61 BPM | SYSTOLIC BLOOD PRESSURE: 138 MMHG | BODY MASS INDEX: 38.19 KG/M2 | TEMPERATURE: 97.9 F | RESPIRATION RATE: 16 BRPM | DIASTOLIC BLOOD PRESSURE: 70 MMHG

## 2025-04-17 DIAGNOSIS — E11.9 TYPE 2 DIABETES MELLITUS WITHOUT COMPLICATION, WITH LONG-TERM CURRENT USE OF INSULIN: ICD-10-CM

## 2025-04-17 DIAGNOSIS — R14.0 BLOATING: ICD-10-CM

## 2025-04-17 DIAGNOSIS — R14.2 BELCHING: ICD-10-CM

## 2025-04-17 DIAGNOSIS — E66.01 MORBID (SEVERE) OBESITY DUE TO EXCESS CALORIES: ICD-10-CM

## 2025-04-17 DIAGNOSIS — R10.13 EPIGASTRIC PAIN: ICD-10-CM

## 2025-04-17 DIAGNOSIS — R68.89 COLD INTOLERANCE: ICD-10-CM

## 2025-04-17 DIAGNOSIS — Z79.4 TYPE 2 DIABETES MELLITUS WITHOUT COMPLICATION, WITH LONG-TERM CURRENT USE OF INSULIN: ICD-10-CM

## 2025-04-17 DIAGNOSIS — R11.0 NAUSEA: Primary | ICD-10-CM

## 2025-04-17 DIAGNOSIS — R11.0 NAUSEA: ICD-10-CM

## 2025-04-17 PROBLEM — L98.491 SKIN ULCER OF ABDOMEN, LIMITED TO BREAKDOWN OF SKIN (HCC): Status: ACTIVE | Noted: 2025-04-17

## 2025-04-17 LAB
ALBUMIN SERPL-MCNC: 3.8 G/DL (ref 3.5–5.2)
ALBUMIN/GLOB SERPL: 1.3 {RATIO} (ref 1–2.5)
ALP SERPL-CCNC: 98 U/L (ref 35–104)
ALT SERPL-CCNC: 17 U/L (ref 10–35)
ANION GAP SERPL CALCULATED.3IONS-SCNC: 10 MMOL/L (ref 9–16)
AST SERPL-CCNC: 17 U/L (ref 10–35)
BASOPHILS # BLD: 0.03 K/UL (ref 0–0.2)
BASOPHILS NFR BLD: 0 % (ref 0–2)
BILIRUB SERPL-MCNC: 0.8 MG/DL (ref 0–1.2)
BUN SERPL-MCNC: 17 MG/DL (ref 8–23)
CALCIUM SERPL-MCNC: 9.8 MG/DL (ref 8.6–10.4)
CHLORIDE SERPL-SCNC: 107 MMOL/L (ref 98–107)
CO2 SERPL-SCNC: 24 MMOL/L (ref 20–31)
CREAT SERPL-MCNC: 1 MG/DL (ref 0.6–0.9)
EOSINOPHIL # BLD: 0.16 K/UL (ref 0–0.44)
EOSINOPHILS RELATIVE PERCENT: 2 % (ref 1–4)
ERYTHROCYTE [DISTWIDTH] IN BLOOD BY AUTOMATED COUNT: 12.4 % (ref 11.8–14.4)
GFR, ESTIMATED: 61 ML/MIN/1.73M2
GLUCOSE SERPL-MCNC: 255 MG/DL (ref 74–99)
HCT VFR BLD AUTO: 42.8 % (ref 36.3–47.1)
HGB BLD-MCNC: 14.2 G/DL (ref 11.9–15.1)
IMM GRANULOCYTES # BLD AUTO: 0.03 K/UL (ref 0–0.3)
IMM GRANULOCYTES NFR BLD: 0 %
LIPASE SERPL-CCNC: 33 U/L (ref 13–60)
LYMPHOCYTES NFR BLD: 1.93 K/UL (ref 1.1–3.7)
LYMPHOCYTES RELATIVE PERCENT: 24 % (ref 24–43)
MCH RBC QN AUTO: 30.6 PG (ref 25.2–33.5)
MCHC RBC AUTO-ENTMCNC: 33.2 G/DL (ref 28.4–34.8)
MCV RBC AUTO: 92.2 FL (ref 82.6–102.9)
MONOCYTES NFR BLD: 0.42 K/UL (ref 0.1–1.2)
MONOCYTES NFR BLD: 5 % (ref 3–12)
NEUTROPHILS NFR BLD: 69 % (ref 36–65)
NEUTS SEG NFR BLD: 5.37 K/UL (ref 1.5–8.1)
NRBC BLD-RTO: 0 PER 100 WBC
PLATELET # BLD AUTO: 199 K/UL (ref 138–453)
PMV BLD AUTO: 10.1 FL (ref 8.1–13.5)
POTASSIUM SERPL-SCNC: 4.3 MMOL/L (ref 3.7–5.3)
PROT SERPL-MCNC: 6.7 G/DL (ref 6.6–8.7)
RBC # BLD AUTO: 4.64 M/UL (ref 3.95–5.11)
SODIUM SERPL-SCNC: 141 MMOL/L (ref 136–145)
TSH SERPL DL<=0.05 MIU/L-ACNC: 2.54 UIU/ML (ref 0.27–4.2)
WBC OTHER # BLD: 7.9 K/UL (ref 3.5–11.3)

## 2025-04-17 PROCEDURE — 84443 ASSAY THYROID STIM HORMONE: CPT

## 2025-04-17 PROCEDURE — 3078F DIAST BP <80 MM HG: CPT | Performed by: STUDENT IN AN ORGANIZED HEALTH CARE EDUCATION/TRAINING PROGRAM

## 2025-04-17 PROCEDURE — 85025 COMPLETE CBC W/AUTO DIFF WBC: CPT

## 2025-04-17 PROCEDURE — 1159F MED LIST DOCD IN RCRD: CPT | Performed by: STUDENT IN AN ORGANIZED HEALTH CARE EDUCATION/TRAINING PROGRAM

## 2025-04-17 PROCEDURE — 99212 OFFICE O/P EST SF 10 MIN: CPT | Performed by: STUDENT IN AN ORGANIZED HEALTH CARE EDUCATION/TRAINING PROGRAM

## 2025-04-17 PROCEDURE — 99214 OFFICE O/P EST MOD 30 MIN: CPT | Performed by: STUDENT IN AN ORGANIZED HEALTH CARE EDUCATION/TRAINING PROGRAM

## 2025-04-17 PROCEDURE — 3075F SYST BP GE 130 - 139MM HG: CPT | Performed by: STUDENT IN AN ORGANIZED HEALTH CARE EDUCATION/TRAINING PROGRAM

## 2025-04-17 PROCEDURE — 1123F ACP DISCUSS/DSCN MKR DOCD: CPT | Performed by: STUDENT IN AN ORGANIZED HEALTH CARE EDUCATION/TRAINING PROGRAM

## 2025-04-17 PROCEDURE — 83690 ASSAY OF LIPASE: CPT

## 2025-04-17 PROCEDURE — 93005 ELECTROCARDIOGRAM TRACING: CPT | Performed by: STUDENT IN AN ORGANIZED HEALTH CARE EDUCATION/TRAINING PROGRAM

## 2025-04-17 PROCEDURE — 93010 ELECTROCARDIOGRAM REPORT: CPT | Performed by: STUDENT IN AN ORGANIZED HEALTH CARE EDUCATION/TRAINING PROGRAM

## 2025-04-17 PROCEDURE — 36415 COLL VENOUS BLD VENIPUNCTURE: CPT

## 2025-04-17 PROCEDURE — 80053 COMPREHEN METABOLIC PANEL: CPT

## 2025-04-17 RX ORDER — TIRZEPATIDE 2.5 MG/.5ML
INJECTION, SOLUTION SUBCUTANEOUS
COMMUNITY
Start: 2025-03-25

## 2025-04-17 RX ORDER — ONDANSETRON 4 MG/1
4 TABLET, ORALLY DISINTEGRATING ORAL 3 TIMES DAILY PRN
Qty: 21 TABLET | Refills: 0 | Status: SHIPPED | OUTPATIENT
Start: 2025-04-17 | End: 2025-04-24

## 2025-04-17 ASSESSMENT — PATIENT HEALTH QUESTIONNAIRE - PHQ9
SUM OF ALL RESPONSES TO PHQ QUESTIONS 1-9: 0
2. FEELING DOWN, DEPRESSED OR HOPELESS: NOT AT ALL
1. LITTLE INTEREST OR PLEASURE IN DOING THINGS: NOT AT ALL

## 2025-04-17 NOTE — PATIENT INSTRUCTIONS
If this is gastroparesis there should be dietary Recommendations:  - Small, frequent meals: Eating smaller amounts more often can help reduce symptoms.  - Low-fat foods: High-fat foods can slow down stomach emptying and worsen symptoms. Choose low-fat options whenever possible.  - Low-fiber foods: Foods high in fiber can be hard to digest and may worsen symptoms. Opt for low-fiber foods.  - Liquid meals: Liquids empty from the stomach faster than solids. Consider liquid meals or blenderized foods.  - Small particle diet: Foods that are finely chopped or pureed are easier to digest and can help improve symptoms.  Foods to Avoid:   Fatty, fried foods (e.g., fried chicken, boss)   High-fiber vegetables (e.g., broccoli, cabbage)   Acidic foods (e.g., orange juice, tomatoes)   Spicy foods (e.g., salsa, peppers)  Foods That May Help:   [place], starchy foods (e.g., saltine crackers, white rice)   Sweet foods (e.g., jello, applesauce)   Clear soups and broths   Ginger ale and tea  Additional Tips:   Chew food thoroughly: This helps break down food into smaller particles, making it easier to digest.   Stay hydrated: Drink plenty of water throughout the day.   Monitor blood sugar: If you have diabetes, keeping your blood sugar levels stable can help manage gastroparesis symptoms.  If dietary changes are not enough to control your symptoms, talk to your doctor about other treatments, such as medications or nutritional supplements.

## 2025-04-18 ENCOUNTER — RESULTS FOLLOW-UP (OUTPATIENT)
Age: 70
End: 2025-04-18

## 2025-04-18 LAB
BACTERIA URNS QL MICRO: NORMAL
BILIRUB UR QL STRIP: NEGATIVE
CASTS #/AREA URNS LPF: NORMAL /LPF (ref 0–8)
CLARITY UR: CLEAR
COLOR UR: YELLOW
CREAT UR-MCNC: 200 MG/DL (ref 28–217)
EPI CELLS #/AREA URNS HPF: NORMAL /HPF (ref 0–5)
GLUCOSE UR STRIP-MCNC: ABNORMAL MG/DL
HGB UR QL STRIP.AUTO: NEGATIVE
KETONES UR STRIP-MCNC: NEGATIVE MG/DL
LEUKOCYTE ESTERASE UR QL STRIP: NEGATIVE
MICROALBUMIN UR-MCNC: 53 MG/L (ref 0–20)
MICROALBUMIN/CREAT UR-RTO: 27 MCG/MG CREAT (ref 0–25)
NITRITE UR QL STRIP: NEGATIVE
PH UR STRIP: 5.5 [PH] (ref 5–8)
PROT UR STRIP-MCNC: ABNORMAL MG/DL
RBC #/AREA URNS HPF: NORMAL /HPF (ref 0–4)
SP GR UR STRIP: 1.02 (ref 1–1.03)
UROBILINOGEN UR STRIP-ACNC: NORMAL EU/DL (ref 0–1)
WBC #/AREA URNS HPF: NORMAL /HPF (ref 0–5)

## 2025-04-21 DIAGNOSIS — E78.5 HYPERLIPIDEMIA, UNSPECIFIED HYPERLIPIDEMIA TYPE: ICD-10-CM

## 2025-04-21 RX ORDER — PITAVASTATIN CALCIUM 4.18 MG/1
1 TABLET, FILM COATED ORAL NIGHTLY
Qty: 90 TABLET | Refills: 0 | Status: SHIPPED | OUTPATIENT
Start: 2025-04-21

## 2025-04-23 NOTE — PROGRESS NOTES
[facility]  [address] [place] [address]  Phone: [phone]  Fax: [phone]      Date of Visit:  2025  Patient Name: Lucero Rose   Patient :  1955     HPI:     Lucero Rose is a 69 y.o. 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    Patient reports her nausea has improved since adjusting her meal sizes and watching what she consumes. She has had little to no nausea in the last two days. No fevers, chills, abdominal pain, vomiting, constipation or diarrhea. She has been keeping track of food triggers and notes barrow, salad dressing, butter or grease tends to worsen her symptoms. She is sticking to lean meats for protein and increasing her vegetable intake. Portion sizes are consistent as she tends to use the same size plates for meals. She has not required as needed zofran for nausea. She did not complete her gastric emptying study or CT abdomen/pelvis yet. She does have an appointment with GI in July.     In regards to her uncontrolled diabetes, her Dexcom readings show that she is consistently averaging in the 250s fasting. She is not taking Trulicity or Mounjaro due to concern of gastroparesis. Patient has upcoming endocrine appointment in . Denies any other acute complaints.     Cold intolerance is chronic and mostly unchanged though she states the warm weather helps. TSH was within normal limits on Synthroid.    I personally reviewed the patient's past medical history, current medications, allergies, surgical

## 2025-04-28 ENCOUNTER — OFFICE VISIT (OUTPATIENT)
Age: 70
End: 2025-04-28

## 2025-04-28 ENCOUNTER — OFFICE VISIT (OUTPATIENT)
Dept: SURGERY | Age: 70
End: 2025-04-28
Payer: MEDICARE

## 2025-04-28 VITALS
DIASTOLIC BLOOD PRESSURE: 63 MMHG | HEART RATE: 58 BPM | WEIGHT: 225.8 LBS | RESPIRATION RATE: 16 BRPM | SYSTOLIC BLOOD PRESSURE: 131 MMHG | BODY MASS INDEX: 37.62 KG/M2 | TEMPERATURE: 98 F | HEIGHT: 65 IN

## 2025-04-28 VITALS
WEIGHT: 225 LBS | HEIGHT: 65 IN | DIASTOLIC BLOOD PRESSURE: 68 MMHG | SYSTOLIC BLOOD PRESSURE: 133 MMHG | HEART RATE: 62 BPM | BODY MASS INDEX: 37.49 KG/M2

## 2025-04-28 DIAGNOSIS — R11.0 NAUSEA: Primary | ICD-10-CM

## 2025-04-28 DIAGNOSIS — E65 FAT DEPOSITS: ICD-10-CM

## 2025-04-28 DIAGNOSIS — D05.12 DUCTAL CARCINOMA IN SITU (DCIS) OF LEFT BREAST: Primary | ICD-10-CM

## 2025-04-28 PROCEDURE — 3075F SYST BP GE 130 - 139MM HG: CPT | Performed by: SURGERY

## 2025-04-28 PROCEDURE — 1123F ACP DISCUSS/DSCN MKR DOCD: CPT | Performed by: SURGERY

## 2025-04-28 PROCEDURE — 3078F DIAST BP <80 MM HG: CPT | Performed by: SURGERY

## 2025-04-28 PROCEDURE — 99214 OFFICE O/P EST MOD 30 MIN: CPT | Performed by: SURGERY

## 2025-04-28 NOTE — PROGRESS NOTES
performed with  tomosynthesis.  2D standard and 3D tomosynthesis combination imaging  performed through both breasts.  Computer aided detection was utilized in the  interpretation of this exam.; Target ultrasound of the left breast was  performed.    VIEWS: Bilateral breast diagnostic mammogram performed including standard CC  and MLO views with tomosynthesis as well as spot tomosynthesis views of the  left breast in CC and MLO projections obtained.    COMPARISON:  Prior mammograms most recent dated 12/15/2022.    HISTORY:  ORDERING SYSTEM PROVIDED HISTORY: Nipple discharge    68-year-old female with 1 week history of dark left nipple discharge.    FINDINGS:    Mammogram:    There are scattered areas of fibroglandular density.    Right breast: Stable subcentimeter oval masses and focal asymmetries  consistent with benign etiology.  No evidence of dominant mass, suspicious  clusters of microcalcifications or architectural distortion.    Left breast: Stable subcentimeter oval masses and focal asymmetries  consistent with benign etiology.  Benign appearing calcifications noted  throughout the left breast.  There are few dilated ducts in the retroareolar  periareolar left breast similar to prior study for which further evaluation  with targeted ultrasound performed.  No evidence of dominant mass, suspicious  clusters of microcalcifications or architectural distortion.      Ultrasound:    Targeted ultrasound of the left breast performed.    Left breast: No suspicious sonographic correlate in the retroareolar  periareolar region for the left nipple discharge.  There are few ectatic and  dilated ducts without evidence of intraluminal mass.  No evidence of solid  mass or suspicious sonographic abnormalities.    Impression  No mammographic or sonographic evidence of malignancy.  No suspicious imaging  correlate for the left nipple discharge.  Clinical follow-up is advised.    BI-RADS 2    BIRADS:  BIRADS - CATEGORY

## 2025-04-28 NOTE — PATIENT INSTRUCTIONS
Thank you for following up with us at [facility] outpatient residency clinic! It was a pleasure to meet you today!     Our plan is the following:  - I'm glad your nausea is improved. I suspect this is most likely gastroparesis secondary to medication, but the imaging studies will give us more info.   - Please schedule and complete your imaging (CT abdomen/pelvis and gastric emptying study)  - Please follow up with Endocrine and GI   - Please continue to monitor what foods might trigger your symptoms and eat small and frequent meals    If you have any additional questions or concerns, please call the office ([phone]) and speak to one of the staff. They will triage and forward the message to the doctors! Have a great rest of your day!

## 2025-04-29 ENCOUNTER — PREP FOR PROCEDURE (OUTPATIENT)
Dept: SURGERY | Age: 70
End: 2025-04-29

## 2025-04-29 ENCOUNTER — TELEPHONE (OUTPATIENT)
Dept: SURGERY | Age: 70
End: 2025-04-29

## 2025-04-29 DIAGNOSIS — Z85.3 HISTORY OF BREAST CANCER: ICD-10-CM

## 2025-04-29 ASSESSMENT — ENCOUNTER SYMPTOMS
CONSTIPATION: 0
NAUSEA: 1
ABDOMINAL PAIN: 0
VOMITING: 0
DIARRHEA: 0

## 2025-04-29 NOTE — TELEPHONE ENCOUNTER
Spoke to patient, surgery scheduled at [facility]. Patient confirmed and information sent to patient(s) elia.    Surgery date/time: 6/10/25 at 9:30am  Arrival time: 7:30am  PAT: 5/28/25 at 9:30am  Post op: 6/23/25 at 11:45am

## 2025-05-02 DIAGNOSIS — Z88.9 MULTIPLE ALLERGIES: ICD-10-CM

## 2025-05-02 RX ORDER — EPINEPHRINE 0.3 MG/.3ML
0.3 INJECTION SUBCUTANEOUS PRN
Qty: 2 EACH | Refills: 3 | Status: SHIPPED | OUTPATIENT
Start: 2025-05-02

## 2025-05-05 ENCOUNTER — HOSPITAL ENCOUNTER (OUTPATIENT)
Dept: NUCLEAR MEDICINE | Age: 70
Discharge: HOME | End: 2025-05-07
Attending: STUDENT IN AN ORGANIZED HEALTH CARE EDUCATION/TRAINING PROGRAM
Payer: MEDICARE

## 2025-05-05 DIAGNOSIS — R11.0 NAUSEA: ICD-10-CM

## 2025-05-05 DIAGNOSIS — R10.13 EPIGASTRIC PAIN: ICD-10-CM

## 2025-05-05 PROCEDURE — 78264 GASTRIC EMPTYING IMG STUDY: CPT

## 2025-05-05 PROCEDURE — A9541 TC99M SULFUR COLLOID: HCPCS | Performed by: STUDENT IN AN ORGANIZED HEALTH CARE EDUCATION/TRAINING PROGRAM

## 2025-05-05 PROCEDURE — 3430000000 HC RX DIAGNOSTIC RADIOPHARMACEUTICAL: Performed by: STUDENT IN AN ORGANIZED HEALTH CARE EDUCATION/TRAINING PROGRAM

## 2025-05-05 RX ADMIN — Medication 1.08 MILLICURIE: at 11:35

## 2025-05-06 ENCOUNTER — TELEPHONE (OUTPATIENT)
Age: 70
End: 2025-05-06

## 2025-05-06 NOTE — TELEPHONE ENCOUNTER
Please call and notify patient of Dr. Gutierrez's message    \"Hi Lucero,      Your gastric emptying study was read as negative. No evidence of delayed gastric emptying. Would recommend follow up with the CT scan and GI follow up.     Please let me know if you have any questions. I hope you are feeling better.     Best,  Kaitlin Gutierrez\"

## 2025-05-20 ENCOUNTER — TELEPHONE (OUTPATIENT)
Age: 70
End: 2025-05-20

## 2025-05-27 NOTE — H&P (VIEW-ONLY)
BLOCK performed by Michaela Saldana MD at [facility] OR    OTHER SURGICAL HISTORY N/A 01/20/2017    TUR-BT    OVARY REMOVAL      TONSILLECTOMY AND ADENOIDECTOMY      at age 13    TOTAL NEPHRECTOMY Left 09/1999    UMBILICAL HERNIA REPAIR      with mesh       SOCIAL HISTORY       Social History     Socioeconomic History    Marital status:      Spouse name: None    Number of children: None    Years of education: None    Highest education level: None   Tobacco Use    Smoking status: Never    Smokeless tobacco: Never   Vaping Use    Vaping status: Never Used   Substance and Sexual Activity    Alcohol use: No    Drug use: No     Social Drivers of Health     Financial Resource Strain: Low Risk  (7/3/2024)    Overall Financial Resource Strain (CARDIA)     Difficulty of Paying Living Expenses: Not hard at all   Food Insecurity: No Food Insecurity (3/25/2025)    Received from [facility]    Hunger Screening     Within the past 12 months we worried whether our food would run out before we got money to buy more.: Never True     Within the past 12 months the food we bought just didn't last and we didn't have money to get more.: Never True   Transportation Needs: No Transportation Needs (8/13/2024)    PRAPARE - Transportation     Lack of Transportation (Medical): No     Lack of Transportation (Non-Medical): No    [facility]    [place] Safety & Environment   Housing Stability: Low Risk  (8/13/2024)    Housing Stability Vital Sign     Unable to Pay for Housing in the Last Year: No     Number of Times Moved in the Last Year: 1     Homeless in the Last Year: No       REVIEW OF SYSTEMS      Allergies   Allergen Reactions    Latex Itching, Other (See Comments) and Rash     Pt states very slight reaction after prolonged exsposure    Rosuvastatin Nausea And Vomiting     Other Reaction(s): Vomiting    Ampicillin Hives    Ciprofloxacin Hives and Swelling    Clonidine Derivatives

## 2025-05-27 NOTE — H&P
HISTORY and PHYSICAL  [facility]       NAME:  Lucero Rose  MRN: [id number]   YOB: 1955   Date: 5/28/2025   Age: 69 y.o.  Gender: female     COMPLAINT AND PRESENT HISTORY:   Lucero Rose is 69 y.o.,  female, presents for pre-anesthesia/admission testing for BILATERAL CHEST WALL FAT EXCISION per Dr. SALDANA.    Primary dx: History of breast cancer [Z85.3].    HPI:  See portion of the note below per Dr Saldana from office visit on 4/28/2025   Pt is a 69 y.o. female who presents to [facility] for a follow-up visit after bilateral mastectomies in August 2024 for left breast ductal carcinoma in situ.  Overall she is doing well.  However she does have complaints of the lateral chest wall flat that is residual after mastectomy causing her discomfort.  It interferes with sleep and ability to wear a bra comfortably despite trying different bras.    Update HPI:    Lucero Rose is 69 y.o.,  female, has hx of breast cancer and she had bilateral breast mastectomies in last August 2024.  Pt reports noticing  a firm lump, with associated redness and bruising around the bilateral  mastectomy scars. She denies any drainage.  She denies fever/chills, chest pain or SOB.    Review of additional significant medical hx:  DIABETED MELLITUS managed with taking :  INSULIN     Hemoglobin A1C   Date Value Ref Range Status   01/23/2024 9.7 % Final     GERD managed with taking : currently no medication     HTN, HLD managed with taking: LIVALO, MOUNJARO, COREG, NORVASC, ALDACTONE   Pt denies feeling of chest pain , SOB, palpitation , leg swelling or dizziness.     BP Readings from Last 3 Encounters:   05/28/25 (!) 155/68   04/28/25 133/68   04/28/25 131/63     ECG 4/17/2025  Sinus bradycardia  T-wave abnormality  Borderline ECG     Thyroid disease ( hypothyroidism) managed with taking : Synthroid     Lab Results   Component Value Date    TSH 2.54 04/17/2025     SLEEP APNEA: has CPAP but pt

## 2025-05-28 ENCOUNTER — HOSPITAL ENCOUNTER (OUTPATIENT)
Dept: PREADMISSION TESTING | Age: 70
Discharge: HOME | End: 2025-05-28
Attending: SURGERY | Admitting: SURGERY
Payer: MEDICARE

## 2025-05-28 VITALS
BODY MASS INDEX: 37.49 KG/M2 | RESPIRATION RATE: 12 BRPM | DIASTOLIC BLOOD PRESSURE: 68 MMHG | OXYGEN SATURATION: 97 % | SYSTOLIC BLOOD PRESSURE: 155 MMHG | HEART RATE: 56 BPM | HEIGHT: 65 IN | WEIGHT: 225 LBS | TEMPERATURE: 96.4 F

## 2025-05-28 DIAGNOSIS — Z01.818 PREOP EXAMINATION: Primary | ICD-10-CM

## 2025-05-28 LAB
ANION GAP SERPL CALCULATED.3IONS-SCNC: 10 MMOL/L (ref 9–16)
BASOPHILS # BLD: 0.07 K/UL (ref 0–0.2)
BASOPHILS NFR BLD: 1 % (ref 0–2)
BUN SERPL-MCNC: 13 MG/DL (ref 8–23)
CALCIUM SERPL-MCNC: 9.7 MG/DL (ref 8.6–10.4)
CHLORIDE SERPL-SCNC: 105 MMOL/L (ref 98–107)
CO2 SERPL-SCNC: 24 MMOL/L (ref 20–31)
CREAT SERPL-MCNC: 1 MG/DL (ref 0.7–1.2)
EOSINOPHIL # BLD: 0.21 K/UL (ref 0–0.44)
EOSINOPHILS RELATIVE PERCENT: 3 % (ref 0–4)
ERYTHROCYTE [DISTWIDTH] IN BLOOD BY AUTOMATED COUNT: 12.5 % (ref 11.5–14.9)
GFR, ESTIMATED: 61 ML/MIN/1.73M2
GLUCOSE SERPL-MCNC: 202 MG/DL (ref 74–99)
HCT VFR BLD AUTO: 43 % (ref 36–46)
HGB BLD-MCNC: 15.3 G/DL (ref 12–16)
IMM GRANULOCYTES # BLD AUTO: 0 K/UL (ref 0–0.3)
IMM GRANULOCYTES NFR BLD: 0 %
LYMPHOCYTES NFR BLD: 2.13 K/UL (ref 1.1–3.7)
LYMPHOCYTES RELATIVE PERCENT: 30 % (ref 24–44)
MCH RBC QN AUTO: 32.4 PG (ref 26–34)
MCHC RBC AUTO-ENTMCNC: 35.6 G/DL (ref 31–37)
MCV RBC AUTO: 91.1 FL (ref 80–100)
MONOCYTES NFR BLD: 0.5 K/UL (ref 0.1–1.2)
MONOCYTES NFR BLD: 7 % (ref 3–12)
MORPHOLOGY: NORMAL
NEUTROPHILS NFR BLD: 59 % (ref 36–66)
NEUTS SEG NFR BLD: 4.19 K/UL (ref 1.5–8.1)
NRBC BLD-RTO: 0 PER 100 WBC
PLATELET # BLD AUTO: NORMAL K/UL (ref 150–450)
PLATELET, FLUORESCENCE: 165 K/UL (ref 150–450)
PLATELETS.RETICULATED NFR BLD AUTO: 4.8 % (ref 1.1–10.3)
PMV BLD AUTO: 10.3 FL (ref 8–13.5)
POTASSIUM SERPL-SCNC: 4.3 MMOL/L (ref 3.7–5.3)
RBC # BLD AUTO: 4.72 M/UL (ref 3.95–5.11)
SODIUM SERPL-SCNC: 139 MMOL/L (ref 136–145)
WBC OTHER # BLD: 7.1 K/UL (ref 3.5–11)

## 2025-05-28 PROCEDURE — 36415 COLL VENOUS BLD VENIPUNCTURE: CPT

## 2025-05-28 PROCEDURE — 85025 COMPLETE CBC W/AUTO DIFF WBC: CPT

## 2025-05-28 PROCEDURE — APPSS45 APP SPLIT SHARED TIME 31-45 MINUTES: Performed by: NURSE PRACTITIONER

## 2025-05-28 PROCEDURE — 80048 BASIC METABOLIC PNL TOTAL CA: CPT

## 2025-05-28 ASSESSMENT — ENCOUNTER SYMPTOMS
NAUSEA: 1
APNEA: 1

## 2025-05-28 NOTE — DISCHARGE INSTRUCTIONS
Pre-op Instructions For Out-Patient Surgery    Medication Instructions:  Please stop herbs and any supplements now (includes vitamins and minerals).    For these medications:  Dulaglutide (Trulicity), Exenatide (Byetta and Bydureon, Liraglutide (Victoza), Lixisenatide (Adlyxin), Semaglutide (Ozempic and Rybelsus), Tirzepatide (Mounjaro, Zepbound, Wegovy)- Stop 1 week prior if taking weekly or 1 day prior if taking every 12 hours or daily.     Please contact your surgeon and prescribing physician for pre-op instructions for any blood thinners.    If you have inhalers/aerosol treatments at home, please use them the morning of your surgery and bring the inhalers with you to the hospital.    Please take the following medications the morning of your surgery with a sip of water:    Carvedilol, Amlodipine, Levothryoxine     Surgery Instructions:  After midnight before surgery:  Do not eat or drink anything, including water, mints, gum, and hard candy.  You may brush your teeth without swallowing.  No smoking, chewing tobacco, or street drugs.    Please shower or bathe before surgery.  If you were given Surgical Scrub Chlorhexidine Gluconate Liquid (CHG), please shower the night before and the morning of your surgery following the detailed instructions you received during your pre-admission visit.     Please do not wear any cologne, lotion, powder, deodorant, jewelry, piercings, perfume, makeup, nail polish, hair accessories, or hair spray on the day of surgery.  Wear loose comfortable clothing.    Leave your valuables at home but bring a payment source for any after-surgery prescriptions you plan to fill at [facility].  Bring a storage case for any glasses/contacts.    An adult who is responsible for you MUST remain the in the hospital and drive you home and should be with you for the first 24 hours after surgery.     If having out-patient knee and foot surgeries, please arrange for

## 2025-06-09 ENCOUNTER — ANESTHESIA EVENT (OUTPATIENT)
Dept: OPERATING ROOM | Age: 70
End: 2025-06-09
Payer: MEDICARE

## 2025-06-09 NOTE — PRE-PROCEDURE INSTRUCTIONS
No answer, left message ?     YES, VOICEMAIL                        Unable to leave message ?    When were you told to arrive at hospital ?  0730    Do you have a  ?    Are you on any blood thinners ?     NO                If yes when did you stop taking ?    Do you have your prep Rx filled and instruction ? N/A     Nothing to eat the day before , only clear liquids.N/A    Are you experiencing any covid symptoms ?     Do you have any infections or rash we should be aware of ?      Do you have the Hibiclens soap to use the night before and the morning of surgery ?    Nothing to eat or drink after midnight, only a sip of water to take any medication instructed to take the night before.  Wear comfortable clothing, leave any valuables at home, remove any jewelry and body piercing .

## 2025-06-10 ENCOUNTER — ANESTHESIA (OUTPATIENT)
Dept: OPERATING ROOM | Age: 70
End: 2025-06-10
Payer: MEDICARE

## 2025-06-10 ENCOUNTER — HOSPITAL ENCOUNTER (OUTPATIENT)
Age: 70
Setting detail: OUTPATIENT SURGERY
Discharge: HOME | End: 2025-06-10
Attending: SURGERY | Admitting: SURGERY
Payer: MEDICARE

## 2025-06-10 VITALS
OXYGEN SATURATION: 92 % | HEIGHT: 65 IN | SYSTOLIC BLOOD PRESSURE: 143 MMHG | WEIGHT: 225 LBS | TEMPERATURE: 96.3 F | BODY MASS INDEX: 37.49 KG/M2 | HEART RATE: 56 BPM | RESPIRATION RATE: 16 BRPM | DIASTOLIC BLOOD PRESSURE: 81 MMHG

## 2025-06-10 DIAGNOSIS — Z85.3 HISTORY OF BREAST CANCER: ICD-10-CM

## 2025-06-10 LAB
GLUCOSE BLD-MCNC: 292 MG/DL (ref 65–105)
GLUCOSE BLD-MCNC: 314 MG/DL (ref 65–105)

## 2025-06-10 PROCEDURE — 3600000002 HC SURGERY LEVEL 2 BASE: Performed by: SURGERY

## 2025-06-10 PROCEDURE — 3700000001 HC ADD 15 MINUTES (ANESTHESIA): Performed by: SURGERY

## 2025-06-10 PROCEDURE — 7100000001 HC PACU RECOVERY - ADDTL 15 MIN: Performed by: SURGERY

## 2025-06-10 PROCEDURE — 7100000031 HC ASPR PHASE II RECOVERY - ADDTL 15 MIN: Performed by: SURGERY

## 2025-06-10 PROCEDURE — 7100000000 HC PACU RECOVERY - FIRST 15 MIN: Performed by: SURGERY

## 2025-06-10 PROCEDURE — 6360000002 HC RX W HCPCS: Performed by: NURSE ANESTHETIST, CERTIFIED REGISTERED

## 2025-06-10 PROCEDURE — 6360000002 HC RX W HCPCS: Performed by: SURGERY

## 2025-06-10 PROCEDURE — 6370000000 HC RX 637 (ALT 250 FOR IP): Performed by: ANESTHESIOLOGY

## 2025-06-10 PROCEDURE — 2709999900 HC NON-CHARGEABLE SUPPLY: Performed by: SURGERY

## 2025-06-10 PROCEDURE — 2500000003 HC RX 250 WO HCPCS: Performed by: ANESTHESIOLOGY

## 2025-06-10 PROCEDURE — 6360000002 HC RX W HCPCS: Performed by: ANESTHESIOLOGY

## 2025-06-10 PROCEDURE — 2580000003 HC RX 258: Performed by: ANESTHESIOLOGY

## 2025-06-10 PROCEDURE — 88304 TISSUE EXAM BY PATHOLOGIST: CPT

## 2025-06-10 PROCEDURE — 3700000000 HC ANESTHESIA ATTENDED CARE: Performed by: SURGERY

## 2025-06-10 PROCEDURE — 7100000010 HC PHASE II RECOVERY - FIRST 15 MIN: Performed by: SURGERY

## 2025-06-10 PROCEDURE — 7100000030 HC ASPR PHASE II RECOVERY - FIRST 15 MIN: Performed by: SURGERY

## 2025-06-10 PROCEDURE — 15839 EXC EXCESSIVE SKN OTHER AREA: CPT | Performed by: SURGERY

## 2025-06-10 PROCEDURE — 82947 ASSAY GLUCOSE BLOOD QUANT: CPT

## 2025-06-10 PROCEDURE — 3600000012 HC SURGERY LEVEL 2 ADDTL 15MIN: Performed by: SURGERY

## 2025-06-10 PROCEDURE — 7100000011 HC PHASE II RECOVERY - ADDTL 15 MIN: Performed by: SURGERY

## 2025-06-10 RX ORDER — TRAMADOL HYDROCHLORIDE 50 MG/1
50 TABLET ORAL EVERY 4 HOURS PRN
Qty: 18 TABLET | Refills: 0 | Status: SHIPPED | OUTPATIENT
Start: 2025-06-10 | End: 2025-06-13

## 2025-06-10 RX ORDER — LIDOCAINE HYDROCHLORIDE 10 MG/ML
1 INJECTION, SOLUTION EPIDURAL; INFILTRATION; INTRACAUDAL; PERINEURAL
Status: COMPLETED | OUTPATIENT
Start: 2025-06-10 | End: 2025-06-10

## 2025-06-10 RX ORDER — ONDANSETRON 2 MG/ML
4 INJECTION INTRAMUSCULAR; INTRAVENOUS
Status: DISCONTINUED | OUTPATIENT
Start: 2025-06-10 | End: 2025-06-10 | Stop reason: HOSPADM

## 2025-06-10 RX ORDER — DIPHENHYDRAMINE HYDROCHLORIDE 50 MG/ML
12.5 INJECTION, SOLUTION INTRAMUSCULAR; INTRAVENOUS
Status: DISCONTINUED | OUTPATIENT
Start: 2025-06-10 | End: 2025-06-10 | Stop reason: HOSPADM

## 2025-06-10 RX ORDER — SODIUM CHLORIDE 0.9 % (FLUSH) 0.9 %
5-40 SYRINGE (ML) INJECTION PRN
Status: DISCONTINUED | OUTPATIENT
Start: 2025-06-10 | End: 2025-06-10 | Stop reason: HOSPADM

## 2025-06-10 RX ORDER — ONDANSETRON 2 MG/ML
INJECTION INTRAMUSCULAR; INTRAVENOUS
Status: DISCONTINUED | OUTPATIENT
Start: 2025-06-10 | End: 2025-06-10 | Stop reason: SDUPTHER

## 2025-06-10 RX ORDER — FENTANYL CITRATE 0.05 MG/ML
25 INJECTION, SOLUTION INTRAMUSCULAR; INTRAVENOUS EVERY 5 MIN PRN
Status: DISCONTINUED | OUTPATIENT
Start: 2025-06-10 | End: 2025-06-10 | Stop reason: HOSPADM

## 2025-06-10 RX ORDER — SODIUM CHLORIDE 9 MG/ML
INJECTION, SOLUTION INTRAVENOUS PRN
Status: DISCONTINUED | OUTPATIENT
Start: 2025-06-10 | End: 2025-06-10 | Stop reason: HOSPADM

## 2025-06-10 RX ORDER — CEFAZOLIN SODIUM 1 G/3ML
INJECTION, POWDER, FOR SOLUTION INTRAMUSCULAR; INTRAVENOUS
Status: DISCONTINUED | OUTPATIENT
Start: 2025-06-10 | End: 2025-06-10 | Stop reason: SDUPTHER

## 2025-06-10 RX ORDER — NALOXONE HYDROCHLORIDE 0.4 MG/ML
INJECTION, SOLUTION INTRAMUSCULAR; INTRAVENOUS; SUBCUTANEOUS PRN
Status: DISCONTINUED | OUTPATIENT
Start: 2025-06-10 | End: 2025-06-10 | Stop reason: HOSPADM

## 2025-06-10 RX ORDER — MIDAZOLAM HYDROCHLORIDE 2 MG/2ML
INJECTION, SOLUTION INTRAMUSCULAR; INTRAVENOUS
Status: DISCONTINUED | OUTPATIENT
Start: 2025-06-10 | End: 2025-06-10 | Stop reason: SDUPTHER

## 2025-06-10 RX ORDER — SODIUM CHLORIDE 0.9 % (FLUSH) 0.9 %
5-40 SYRINGE (ML) INJECTION EVERY 12 HOURS SCHEDULED
Status: DISCONTINUED | OUTPATIENT
Start: 2025-06-10 | End: 2025-06-10 | Stop reason: HOSPADM

## 2025-06-10 RX ORDER — LIDOCAINE HYDROCHLORIDE 20 MG/ML
INJECTION, SOLUTION EPIDURAL; INFILTRATION; INTRACAUDAL; PERINEURAL
Status: DISCONTINUED | OUTPATIENT
Start: 2025-06-10 | End: 2025-06-10 | Stop reason: SDUPTHER

## 2025-06-10 RX ORDER — METOCLOPRAMIDE HYDROCHLORIDE 5 MG/ML
10 INJECTION INTRAMUSCULAR; INTRAVENOUS
Status: DISCONTINUED | OUTPATIENT
Start: 2025-06-10 | End: 2025-06-10 | Stop reason: HOSPADM

## 2025-06-10 RX ORDER — DEXTROSE MONOHYDRATE 100 MG/ML
INJECTION, SOLUTION INTRAVENOUS CONTINUOUS PRN
Status: DISCONTINUED | OUTPATIENT
Start: 2025-06-10 | End: 2025-06-10 | Stop reason: HOSPADM

## 2025-06-10 RX ORDER — SODIUM CHLORIDE 9 MG/ML
INJECTION, SOLUTION INTRAVENOUS CONTINUOUS
Status: DISCONTINUED | OUTPATIENT
Start: 2025-06-10 | End: 2025-06-10 | Stop reason: HOSPADM

## 2025-06-10 RX ORDER — METOCLOPRAMIDE HYDROCHLORIDE 5 MG/ML
10 INJECTION INTRAMUSCULAR; INTRAVENOUS ONCE
Status: COMPLETED | OUTPATIENT
Start: 2025-06-10 | End: 2025-06-10

## 2025-06-10 RX ORDER — TRAMADOL HYDROCHLORIDE 50 MG/1
50 TABLET ORAL ONCE
Status: COMPLETED | OUTPATIENT
Start: 2025-06-10 | End: 2025-06-10

## 2025-06-10 RX ORDER — ACETAMINOPHEN 500 MG
1000 TABLET ORAL ONCE
Status: COMPLETED | OUTPATIENT
Start: 2025-06-10 | End: 2025-06-10

## 2025-06-10 RX ORDER — GABAPENTIN 100 MG/1
100 CAPSULE ORAL ONCE
Status: COMPLETED | OUTPATIENT
Start: 2025-06-10 | End: 2025-06-10

## 2025-06-10 RX ORDER — PROPOFOL 10 MG/ML
INJECTION, EMULSION INTRAVENOUS
Status: DISCONTINUED | OUTPATIENT
Start: 2025-06-10 | End: 2025-06-10 | Stop reason: SDUPTHER

## 2025-06-10 RX ORDER — INSULIN LISPRO 100 [IU]/ML
5 INJECTION, SOLUTION INTRAVENOUS; SUBCUTANEOUS ONCE
Status: COMPLETED | OUTPATIENT
Start: 2025-06-10 | End: 2025-06-10

## 2025-06-10 RX ADMIN — LIDOCAINE HYDROCHLORIDE 100 MG: 20 INJECTION, SOLUTION EPIDURAL; INFILTRATION; INTRACAUDAL; PERINEURAL at 09:05

## 2025-06-10 RX ADMIN — FAMOTIDINE 20 MG: 10 INJECTION, SOLUTION INTRAVENOUS at 08:28

## 2025-06-10 RX ADMIN — TRAMADOL HYDROCHLORIDE 50 MG: 50 TABLET ORAL at 12:07

## 2025-06-10 RX ADMIN — LIDOCAINE HYDROCHLORIDE 1 ML: 10 INJECTION, SOLUTION EPIDURAL; INFILTRATION; INTRACAUDAL; PERINEURAL at 08:06

## 2025-06-10 RX ADMIN — MIDAZOLAM HYDROCHLORIDE 2 MG: 1 INJECTION, SOLUTION INTRAMUSCULAR; INTRAVENOUS at 08:58

## 2025-06-10 RX ADMIN — PROPOFOL 200 MG: 10 INJECTION, EMULSION INTRAVENOUS at 09:05

## 2025-06-10 RX ADMIN — METOCLOPRAMIDE 10 MG: 5 INJECTION, SOLUTION INTRAMUSCULAR; INTRAVENOUS at 08:27

## 2025-06-10 RX ADMIN — CEFAZOLIN 2 G: 1 INJECTION, POWDER, FOR SOLUTION INTRAMUSCULAR; INTRAVENOUS at 09:11

## 2025-06-10 RX ADMIN — GABAPENTIN 100 MG: 100 CAPSULE ORAL at 08:10

## 2025-06-10 RX ADMIN — ONDANSETRON 4 MG: 2 INJECTION, SOLUTION INTRAMUSCULAR; INTRAVENOUS at 09:19

## 2025-06-10 RX ADMIN — ACETAMINOPHEN 1000 MG: 500 TABLET ORAL at 08:10

## 2025-06-10 RX ADMIN — INSULIN LISPRO 5 UNITS: 100 INJECTION, SOLUTION INTRAVENOUS; SUBCUTANEOUS at 08:28

## 2025-06-10 RX ADMIN — SODIUM CHLORIDE: 900 INJECTION, SOLUTION INTRAVENOUS at 08:58

## 2025-06-10 ASSESSMENT — PAIN - FUNCTIONAL ASSESSMENT
PAIN_FUNCTIONAL_ASSESSMENT: NONE - DENIES PAIN
PAIN_FUNCTIONAL_ASSESSMENT: 0-10
PAIN_FUNCTIONAL_ASSESSMENT: NONE - DENIES PAIN

## 2025-06-10 NOTE — OP NOTE
Operative Note      Patient: Lucero Rose  YOB: 1955  MRN: [id number]    Date of Procedure: 6/10/2025    Pre-Op Diagnosis Codes:      * History of breast cancer [Z85.3]    Post-Op Diagnosis: Same       Procedure(s):  BILATERAL CHEST WALL FAT EXCISION    Surgeon(s):  Michaela Saldana MD    Assistant:   First Assistant: Priya Murrieta    Anesthesia: General    Estimated Blood Loss (mL): Minimal    Complications: None    Specimens:   ID Type Source Tests Collected by Time Destination   A : RIGHT CHEST WALL EXCISION SHORT STITCH SUPERIOR, LONG STITCH LATERAL Tissue Chest SURGICAL PATHOLOGY Michaela Saldana MD 6/10/2025 0929    B : LEFT CHEST WALL EXCISION SHORT STITCH SUPERIOR, LONG STITCH LATERAL Tissue Chest SURGICAL PATHOLOGY Michaela Saldana MD 6/10/2025 0930        Implants:  * No implants in log *      Drains: * No LDAs found *    Findings:  Infection Present At Time Of Surgery (PATOS) (choose all levels that have infection present):  No infection present  Other Findings: no additional findings      Detailed Description of Procedure:   The patient had bilateral mastectomies for history of left breast ductal carcinoma in situ.  She subsequently had excess fatty tissue on the chest wall that made her uncomfortable.  She desired excision to get rid of the discomfort associated with that tissue.  She provide informed consent signed operating room.    The patient was placed on the OR table in supine position with padding of all bony prominences.  Anesthesia was induced.  The patient was prepped and draped in a sterile fashion.  A proper timeout was taken.     The bilateral excisions were performed in similar fashion.  At the right side was done first.  Local anesthetic consisting of 1% lidocaine quarter percent Marcaine was mixed half-and-half and injected for the incision and around the planned excision site.  Once the incision was made with the scalpel, the electrocautery was used to perform the excision.

## 2025-06-10 NOTE — DISCHARGE INSTRUCTIONS
eating lightly (broth, soup, crackers, toast, etc.) advancing as tolerated to your usual diet.  Try to avoid spicy and greasy/fatty foods for 24 hours. Drink plenty of fluids after surgery, unless you are on a fluid restriction.  Avoid milk/milk product for several hours.    Call your surgeon for the following:  You have pain that does not get better after you take pain medicine.   For an oral temperature (by mouth) is 101 degrees or higher, chills, or excessive sweating.  You have increasing and progressive bleeding or drainage from surgery site.  Signs of an infection:  increased swelling, redness, warmth, or hardness around surgery area or yellow or green drainage.  Persistent nausea or vomiting and can’t keep fluids down.  If you are unable to urinate within 8 hours of surgery.  Redness or swelling at IV site.  For any questions or concerns you may have.

## 2025-06-10 NOTE — ANESTHESIA PRE PROCEDURE
Department of Anesthesiology  Preprocedure Note       Name:  Lucero Rose   Age:  69 y.o.  :  1955                                          MRN:  [id number]         Date:  6/10/2025      Surgeon: Surgeon(s):  Michaela Saldana MD    Procedure: Procedure(s):  BILATERAL CHEST WALL FAT EXCISION    Medications prior to admission:   Prior to Admission medications    Medication Sig Start Date End Date Taking? Authorizing Provider   EPINEPHrine (EPIPEN) 0.3 MG/0.3ML SOAJ injection Inject 0.3 mLs into the muscle as needed (allegic reaction) Use as directed for allergic reaction 25   Lauren Miller MD   pitavastatin (LIVALO) 4 MG TABS tablet TAKE ONE TABLET BY MOUTH ONCE NIGHTLY 25   Veena Gutierrez MD   MOUNJARO 2.5 MG/0.5ML SOAJ 2.5 mg weekly subcutaneously  Patient not taking: Reported on 2025 3/25/25   Maksim Baltazar MD   levothyroxine (SYNTHROID) 25 MCG tablet Take 1 tablet by mouth every morning Take on an empty stomach. 25   Lauren Miller MD   carvedilol (COREG) 25 MG tablet Take 1 tablet by mouth 2 times daily (with meals) 25   Lauren Miller MD   amLODIPine (NORVASC) 5 MG tablet Take 1 tablet by mouth daily 25   Lauren Miller MD   ONETOUCH ULTRA strip TEST ONCE DAILY AS NEEDED FOR HIGH BLOOD SUGAR  FOR SENSOR FAILURE 24   Maksim Baltazar MD   DROPLET PEN NEEDLES 32G X 4 MM MISC USE UP TO 5 TIMES DAILY 24   Maksim Baltazar MD   MONOJECT ULTRA COMFORT SYRINGE 30G X 5/16\" 0.3 ML MISC Inject 1 Dose into the skin as needed 23   Maksim Baltazar MD   spironolactone (ALDACTONE) 50 MG tablet Take 1 tablet by mouth daily 23   Maksim Baltazar MD   insulin lispro, 0.5 Unit Dial, 100 UNIT/ML SOPN Inject into the skin Sliding scale  Humalog 20   Maksim Baltazar MD   Continuous Blood Gluc Transmit (DEXCOM G6 TRANSMITTER) MISC  23   Maksim Baltazar MD   Continuous Blood Gluc Sensor (DEXCOM G6 SENSOR) MISC Apply 1 application

## 2025-06-10 NOTE — PROGRESS NOTES
CLINICAL PHARMACY NOTE: MEDS TO BEDS    Total # of Prescriptions Filled: 1   The following medications were delivered to the patient:  Tramadol HCL 50MG Tablets     Additional Documentation:  Picked up at the pharmacy by Shayan Rose at 12:19PM 6/10/25

## 2025-06-10 NOTE — INTERVAL H&P NOTE
Update History & Physical    The patient's History and Physical of   May 28, 2025    Patient will be having : Procedure(s):  BILATERAL CHEST WALL FAT EXCISION      There was  no change. Or updates at this time.     Patient did not require any medical clearance.     Blood thinners/ Anticoagulant:  none.      Patient denies any personal or family problems with anesthesia.    Patient was physically assessed, including cardiovascular and respiratory. Denies any chest pain or SOB. Denies any recent URI, no fever or chills.     Patient understands and wants to proceed with the procedure.     Vitals:  BP (!) 155/61   Pulse 58   Temp (!) 96.3 °F (35.7 °C) (Infrared)   Resp 15   Ht 1.651 m (5' 5\")   Wt 102.1 kg (225 lb)   SpO2 95%   BMI 37.44 kg/m²  Body mass index is 37.44 kg/m².    Electronically signed by NICOLÁS GUTIÉRREZ CNP on 6/10/2025 at 7:52 AM      Note marked for cosign by provider

## 2025-06-10 NOTE — ANESTHESIA POSTPROCEDURE EVALUATION
Department of Anesthesiology  Postprocedure Note    Patient: Lucero Rose  MRN: [id number]  YOB: 1955  Date of evaluation: 6/10/2025    Procedure Summary       Date: 06/10/25 Room / Location: [address]    Anesthesia Start: 0901 Anesthesia Stop: 1025    Procedure: BILATERAL CHEST WALL FAT EXCISION (Bilateral) Diagnosis:       History of breast cancer      (History of breast cancer [Z85.3])    Surgeons: Michaela Saldana MD Responsible Provider: Gregory Michel MD    Anesthesia Type: general ASA Status: 3            Anesthesia Type: No value filed.    Narcisa Phase I: Narcisa Score: 9    Narcisa Phase II: Narcisa Score: 10    Anesthesia Post Evaluation    Patient location during evaluation: PACU  Patient participation: complete - patient participated  Level of consciousness: awake and alert  Airway patency: patent  Nausea & Vomiting: no vomiting  Cardiovascular status: hemodynamically stable  Respiratory status: acceptable  Hydration status: euvolemic  Comments: POST- ANESTHESIA EVALUATION       Pt Name: Lucero Rose  MRN: [id number]  YOB: 1955  Date of evaluation: 6/10/2025  Time:  12:24 PM      BP (!) 143/81   Pulse 56   Temp (!) 96.3 °F (35.7 °C) (Infrared)   Resp 16   Ht 1.651 m (5' 5\")   Wt 102.1 kg (225 lb)   SpO2 92%   BMI 37.44 kg/m²      Consciousness Level  Awake  Cardiopulmonary Status  Stable  Pain Adequately Treated YES  Nausea / Vomiting  NO  Adequate Hydration  YES  Anesthesia Related Complications NONE      Electronically signed by Gregory Michel MD on 6/10/2025 at 12:24 PM         Pain management: satisfactory to patient    No notable events documented.

## 2025-06-11 LAB — SURGICAL PATHOLOGY REPORT: NORMAL

## 2025-06-13 DIAGNOSIS — I10 PRIMARY HYPERTENSION: ICD-10-CM

## 2025-06-13 RX ORDER — CARVEDILOL 25 MG/1
25 TABLET ORAL 2 TIMES DAILY WITH MEALS
Qty: 180 TABLET | Refills: 1 | Status: SHIPPED | OUTPATIENT
Start: 2025-06-13

## 2025-06-13 RX ORDER — AMLODIPINE BESYLATE 5 MG/1
5 TABLET ORAL DAILY
Qty: 90 TABLET | Refills: 3 | Status: SHIPPED | OUTPATIENT
Start: 2025-06-13

## 2025-06-19 DIAGNOSIS — E78.5 HYPERLIPIDEMIA, UNSPECIFIED HYPERLIPIDEMIA TYPE: ICD-10-CM

## 2025-06-19 RX ORDER — PITAVASTATIN CALCIUM 4.18 MG/1
1 TABLET, FILM COATED ORAL NIGHTLY
Qty: 90 TABLET | Refills: 2 | Status: SHIPPED | OUTPATIENT
Start: 2025-06-19

## 2025-06-23 ENCOUNTER — OFFICE VISIT (OUTPATIENT)
Dept: SURGERY | Age: 70
End: 2025-06-23

## 2025-06-23 VITALS — BODY MASS INDEX: 37.64 KG/M2 | HEART RATE: 56 BPM | OXYGEN SATURATION: 94 % | WEIGHT: 226.2 LBS

## 2025-06-23 DIAGNOSIS — Z85.3 HISTORY OF BREAST CANCER: ICD-10-CM

## 2025-06-23 DIAGNOSIS — E65 FAT DEPOSITS: Primary | ICD-10-CM

## 2025-06-23 PROCEDURE — 99024 POSTOP FOLLOW-UP VISIT: CPT | Performed by: SURGERY

## 2025-06-24 LAB
ESTIMATED AVERAGE GLUCOSE: NORMAL
HBA1C MFR BLD: 10.9 %

## 2025-06-27 PROBLEM — Z01.818 PREOP EXAMINATION: Status: RESOLVED | Noted: 2025-05-28 | Resolved: 2025-06-27

## 2025-07-02 ENCOUNTER — PREP FOR PROCEDURE (OUTPATIENT)
Dept: GASTROENTEROLOGY | Age: 70
End: 2025-07-02

## 2025-07-02 ENCOUNTER — OFFICE VISIT (OUTPATIENT)
Dept: GASTROENTEROLOGY | Age: 70
End: 2025-07-02
Payer: MEDICARE

## 2025-07-02 ENCOUNTER — TELEPHONE (OUTPATIENT)
Dept: GASTROENTEROLOGY | Age: 70
End: 2025-07-02

## 2025-07-02 VITALS
RESPIRATION RATE: 18 BRPM | HEIGHT: 65 IN | WEIGHT: 231 LBS | HEART RATE: 62 BPM | DIASTOLIC BLOOD PRESSURE: 78 MMHG | TEMPERATURE: 97.4 F | OXYGEN SATURATION: 97 % | SYSTOLIC BLOOD PRESSURE: 161 MMHG | BODY MASS INDEX: 38.49 KG/M2

## 2025-07-02 DIAGNOSIS — R10.9 ABDOMINAL PAIN, UNSPECIFIED ABDOMINAL LOCATION: ICD-10-CM

## 2025-07-02 DIAGNOSIS — R14.0 BLOATING: ICD-10-CM

## 2025-07-02 DIAGNOSIS — R19.4 ALTERED BOWEL HABITS: ICD-10-CM

## 2025-07-02 DIAGNOSIS — Z12.11 COLON CANCER SCREENING: Primary | ICD-10-CM

## 2025-07-02 DIAGNOSIS — Z12.11 COLON CANCER SCREENING: ICD-10-CM

## 2025-07-02 DIAGNOSIS — R19.4 ALTERED BOWEL HABITS: Primary | ICD-10-CM

## 2025-07-02 PROCEDURE — 1126F AMNT PAIN NOTED NONE PRSNT: CPT | Performed by: INTERNAL MEDICINE

## 2025-07-02 PROCEDURE — 3077F SYST BP >= 140 MM HG: CPT | Performed by: INTERNAL MEDICINE

## 2025-07-02 PROCEDURE — 1123F ACP DISCUSS/DSCN MKR DOCD: CPT | Performed by: INTERNAL MEDICINE

## 2025-07-02 PROCEDURE — 99204 OFFICE O/P NEW MOD 45 MIN: CPT | Performed by: INTERNAL MEDICINE

## 2025-07-02 PROCEDURE — 3078F DIAST BP <80 MM HG: CPT | Performed by: INTERNAL MEDICINE

## 2025-07-02 PROCEDURE — 1159F MED LIST DOCD IN RCRD: CPT | Performed by: INTERNAL MEDICINE

## 2025-07-02 PROCEDURE — G2211 COMPLEX E/M VISIT ADD ON: HCPCS | Performed by: INTERNAL MEDICINE

## 2025-07-02 RX ORDER — POLYETHYLENE GLYCOL 3350, SODIUM CHLORIDE, SODIUM BICARBONATE, POTASSIUM CHLORIDE 420; 11.2; 5.72; 1.48 G/4L; G/4L; G/4L; G/4L
POWDER, FOR SOLUTION ORAL
Qty: 1 EACH | Refills: 0 | Status: SHIPPED | OUTPATIENT
Start: 2025-07-02

## 2025-07-02 RX ORDER — SIMETHICONE 80 MG
80 TABLET,CHEWABLE ORAL 4 TIMES DAILY PRN
Qty: 180 TABLET | Refills: 3 | Status: SHIPPED | OUTPATIENT
Start: 2025-07-02

## 2025-07-02 ASSESSMENT — ENCOUNTER SYMPTOMS
CONSTIPATION: 0
SORE THROAT: 0
ANAL BLEEDING: 0
ABDOMINAL PAIN: 1
COUGH: 0
VOICE CHANGE: 0
CHOKING: 0
ABDOMINAL DISTENTION: 1
RECTAL PAIN: 0
WHEEZING: 0
VOMITING: 0
NAUSEA: 1
TROUBLE SWALLOWING: 0
BLOOD IN STOOL: 0
DIARRHEA: 1

## 2025-07-02 NOTE — TELEPHONE ENCOUNTER
Pt saw Dr. Krishna today in clinic. Pt states she does not take blood thinners or GLP-1 medications, no cardiac hx. Pt would like procedure in the morning.     Procedure scheduled/Dr Krishna  Procedure: EGD/Colonoscopy  Dx: Bloating; Abdominal pain, unspecified abdominal location; Altered bowel habits; Colon cancer screening  Date: Thursday 07/31/25  Time: 10:45 am/Arrive at 8:45 am  Hospital:  [facility]; Surgery Entrance, back of hospital  PAT Phone Call:  Bowel Prep instructions given: EGD Prep/Nulytely Bowel Prep  In office/via phone: in office  Clearance needed: N/A  GLP-1: N/A    Pt informed it is required they must have a /responsible adult that takes them to their procedure, stays at the hospital (before, during, and after procedure), and drives pt home. Pt informed /responsible adult must stay inside the hospital during their procedure. Pt voiced understanding of  protocol for procedure.     Pt informed they will receive a PAT Phone Call from a [facility] PAT Nurse 1-2 weeks prior to procedure. Pt informed they must complete PAT Call or procedure may be cancelled.

## 2025-07-02 NOTE — PROGRESS NOTES
Subclinical hyperthyroidism     Wears glasses        Past Surgical History:   Procedure Laterality Date    APPENDECTOMY  in 90's    BREAST SURGERY Left 6/25/2024    BREAST CENTRAL DUCT EXCISION, LEFT performed by Michaela Saldana MD at [facility] OR    CHEST SURGERY Bilateral 6/10/2025    BILATERAL CHEST WALL FAT EXCISION performed by Michaela Saldana MD at [facility] OR    CHOLECYSTECTOMY, OPEN  1999    COLONOSCOPY  2016    CYSTOSCOPY  01/03/2017    CYSTOSCOPY  01/20/2017    cysto TURBT    HYSTERECTOMY (CERVIX STATUS UNKNOWN)  1994    KIDNEY REMOVAL  1999    Radical Nephrectomy, left- Dr. Seth    MASTECTOMY Bilateral 8/13/2024    SIMPLE BREAST MASTECTOMY BILATERAL, PECTORAL BLOCK performed by Michaela Saldana MD at [facility] OR    OTHER SURGICAL HISTORY N/A 01/20/2017    TUR-BT    OVARY REMOVAL      TONSILLECTOMY AND ADENOIDECTOMY      at age 13    TOTAL NEPHRECTOMY Left 09/1999    UMBILICAL HERNIA REPAIR      with mesh       CURRENT MEDICATIONS:    Current Outpatient Medications:     pitavastatin (LIVALO) 4 MG TABS tablet, Take 1 tablet by mouth nightly, Disp: 90 tablet, Rfl: 2    amLODIPine (NORVASC) 5 MG tablet, Take 1 tablet by mouth daily, Disp: 90 tablet, Rfl: 3    carvedilol (COREG) 25 MG tablet, Take 1 tablet by mouth 2 times daily (with meals), Disp: 180 tablet, Rfl: 1    EPINEPHrine (EPIPEN) 0.3 MG/0.3ML SOAJ injection, Inject 0.3 mLs into the muscle as needed (allegic reaction) Use as directed for allergic reaction, Disp: 2 each, Rfl: 3    MOUNJARO 2.5 MG/0.5ML SOAJ, , Disp: , Rfl:     levothyroxine (SYNTHROID) 25 MCG tablet, Take 1 tablet by mouth every morning Take on an empty stomach., Disp: 90 tablet, Rfl: 1    ONETOUCH ULTRA strip, TEST ONCE DAILY AS NEEDED FOR HIGH BLOOD SUGAR  FOR SENSOR FAILURE, Disp: , Rfl:     DROPLET PEN NEEDLES 32G X 4 MM MISC, USE UP TO 5 TIMES DAILY, Disp: , Rfl:     MONOJECT ULTRA COMFORT SYRINGE 30G X 5/16\" 0.3 ML MISC, Inject 1 Dose into the skin as needed, Disp: , Rfl:     spironolactone

## 2025-07-17 ENCOUNTER — HOSPITAL ENCOUNTER (OUTPATIENT)
Dept: PREADMISSION TESTING | Age: 70
Discharge: HOME | End: 2025-07-21

## 2025-07-17 VITALS — HEIGHT: 65 IN | BODY MASS INDEX: 38.49 KG/M2 | WEIGHT: 231 LBS

## 2025-07-17 NOTE — PROGRESS NOTES
Pre-op Instructions For Out-Patient Endoscopy Surgery    Medication Instructions:  Please stop herbs and any supplements now (includes vitamins and minerals).    For these medications:  Dulaglutide (Trulicity), Exenatide (Byetta and Bydureon, Liraglutide (Victoza), Lixisenatide (Adlyxin), Semaglutide (Ozempic and Rybelsus), Tirzepatide (Mounjaro, Zepbound,Wegovy)- Stop 1 week prior if taking weekly or 1 day prior if taking every 12 hours or daily.     Please contact your surgeon and prescribing physician for pre-op instructions for any blood thinners. Stop taking as directed by Doctor    If you have inhalers/aerosol treatments at home, please use them the morning of your surgery and bring the inhalers with you to the hospital.    Please take the following medications the morning of your surgery with a sip of water:    Levothyroxine, Amlodipine, Carvedilol    Surgery Instructions:  After midnight before surgery:  Do not eat or drink anything, including water, mints, gum, and hard candy.  You may brush your teeth without swallowing.  No smoking, chewing tobacco, or street drugs.       ** Please Follow Bowel Prep instructions if given by surgeon's office**    Please shower or bathe before surgery.       Please do not wear any cologne, lotion, powder, jewelry, piercings, perfume, makeup, nail polish, hair accessories, or hair spray on the day of surgery.  Wear loose comfortable clothing.    Leave your valuables at home but bring a payment source for any after-surgery prescriptions you plan to fill at [facility].  Bring a storage case for any glasses/contacts.    An adult who is responsible for you MUST remain in the hospital and drive you home and should be with you for the first 24 hours after surgery.     The Day of Surgery:  Arrive at [facility] at the time directed by your surgeon and check in at the desk.     If you have a living will or healthcare power of ,

## 2025-07-29 NOTE — PRE-PROCEDURE INSTRUCTIONS
No answer, left message ?                             Unable to leave message ?    When were you told to arrive at hospital ? 0845     Do you have a  ?yes    Are you on any blood thinners ? no                    If yes when did you stop taking ?    Do you have your prep Rx filled and instruction ? yes     Nothing to eat the day before , only clear liquids.yes    Are you experiencing any covid symptoms ? no    Do you have any infections or rash we should be aware of ?no      Do you have the Hibiclens soap to use the night before and the morning of surgery ?n/a    Nothing to eat or drink after midnight, only a sip of water to take any medication instructed to take the night before.yes  Wear comfortable clothing, leave any valuables at home, remove any jewelry and body piercing . yes

## 2025-07-30 ENCOUNTER — ANESTHESIA EVENT (OUTPATIENT)
Dept: ENDOSCOPY | Age: 70
End: 2025-07-30
Payer: MEDICARE

## 2025-07-30 ENCOUNTER — PATIENT MESSAGE (OUTPATIENT)
Dept: GASTROENTEROLOGY | Age: 70
End: 2025-07-30

## 2025-07-31 ENCOUNTER — HOSPITAL ENCOUNTER (OUTPATIENT)
Age: 70
Setting detail: OUTPATIENT SURGERY
Discharge: HOME | End: 2025-07-31
Attending: INTERNAL MEDICINE | Admitting: INTERNAL MEDICINE
Payer: MEDICARE

## 2025-07-31 ENCOUNTER — ANESTHESIA (OUTPATIENT)
Dept: ENDOSCOPY | Age: 70
End: 2025-07-31
Payer: MEDICARE

## 2025-07-31 VITALS
WEIGHT: 231 LBS | OXYGEN SATURATION: 95 % | DIASTOLIC BLOOD PRESSURE: 67 MMHG | RESPIRATION RATE: 11 BRPM | HEART RATE: 56 BPM | TEMPERATURE: 98.2 F | BODY MASS INDEX: 38.49 KG/M2 | SYSTOLIC BLOOD PRESSURE: 126 MMHG | HEIGHT: 65 IN

## 2025-07-31 DIAGNOSIS — R14.0 BLOATING: ICD-10-CM

## 2025-07-31 DIAGNOSIS — R10.9 ABDOMINAL PAIN, UNSPECIFIED ABDOMINAL LOCATION: ICD-10-CM

## 2025-07-31 DIAGNOSIS — Z12.11 COLON CANCER SCREENING: ICD-10-CM

## 2025-07-31 DIAGNOSIS — R19.4 ALTERED BOWEL HABITS: ICD-10-CM

## 2025-07-31 LAB — GLUCOSE BLD-MCNC: 184 MG/DL (ref 65–105)

## 2025-07-31 PROCEDURE — 7100000011 HC PHASE II RECOVERY - ADDTL 15 MIN: Performed by: INTERNAL MEDICINE

## 2025-07-31 PROCEDURE — 2709999900 HC NON-CHARGEABLE SUPPLY: Performed by: INTERNAL MEDICINE

## 2025-07-31 PROCEDURE — 6360000002 HC RX W HCPCS: Performed by: ANESTHESIOLOGY

## 2025-07-31 PROCEDURE — 3609012400 HC EGD TRANSORAL BIOPSY SINGLE/MULTIPLE: Performed by: INTERNAL MEDICINE

## 2025-07-31 PROCEDURE — 3609010600 HC COLONOSCOPY POLYPECTOMY SNARE/COLD BIOPSY: Performed by: INTERNAL MEDICINE

## 2025-07-31 PROCEDURE — 6360000002 HC RX W HCPCS: Performed by: NURSE ANESTHETIST, CERTIFIED REGISTERED

## 2025-07-31 PROCEDURE — 45385 COLONOSCOPY W/LESION REMOVAL: CPT | Performed by: INTERNAL MEDICINE

## 2025-07-31 PROCEDURE — 2580000003 HC RX 258: Performed by: ANESTHESIOLOGY

## 2025-07-31 PROCEDURE — 3700000000 HC ANESTHESIA ATTENDED CARE: Performed by: INTERNAL MEDICINE

## 2025-07-31 PROCEDURE — 2500000003 HC RX 250 WO HCPCS: Performed by: ANESTHESIOLOGY

## 2025-07-31 PROCEDURE — 7100000010 HC PHASE II RECOVERY - FIRST 15 MIN: Performed by: INTERNAL MEDICINE

## 2025-07-31 PROCEDURE — 3700000001 HC ADD 15 MINUTES (ANESTHESIA): Performed by: INTERNAL MEDICINE

## 2025-07-31 PROCEDURE — 82947 ASSAY GLUCOSE BLOOD QUANT: CPT

## 2025-07-31 PROCEDURE — 43239 EGD BIOPSY SINGLE/MULTIPLE: CPT | Performed by: INTERNAL MEDICINE

## 2025-07-31 PROCEDURE — 45380 COLONOSCOPY AND BIOPSY: CPT | Performed by: INTERNAL MEDICINE

## 2025-07-31 PROCEDURE — 88305 TISSUE EXAM BY PATHOLOGIST: CPT

## 2025-07-31 RX ORDER — PROPOFOL 10 MG/ML
INJECTION, EMULSION INTRAVENOUS
Status: DISCONTINUED | OUTPATIENT
Start: 2025-07-31 | End: 2025-07-31 | Stop reason: SDUPTHER

## 2025-07-31 RX ORDER — LIDOCAINE HYDROCHLORIDE 10 MG/ML
1 INJECTION, SOLUTION EPIDURAL; INFILTRATION; INTRACAUDAL; PERINEURAL
Status: COMPLETED | OUTPATIENT
Start: 2025-07-31 | End: 2025-07-31

## 2025-07-31 RX ORDER — SODIUM CHLORIDE 0.9 % (FLUSH) 0.9 %
5-40 SYRINGE (ML) INJECTION EVERY 12 HOURS SCHEDULED
Status: DISCONTINUED | OUTPATIENT
Start: 2025-07-31 | End: 2025-07-31 | Stop reason: HOSPADM

## 2025-07-31 RX ORDER — PANTOPRAZOLE SODIUM 40 MG/1
40 TABLET, DELAYED RELEASE ORAL
Qty: 30 TABLET | Refills: 1 | Status: SHIPPED | OUTPATIENT
Start: 2025-07-31

## 2025-07-31 RX ORDER — SODIUM CHLORIDE 9 MG/ML
INJECTION, SOLUTION INTRAVENOUS CONTINUOUS
Status: DISCONTINUED | OUTPATIENT
Start: 2025-07-31 | End: 2025-07-31 | Stop reason: HOSPADM

## 2025-07-31 RX ORDER — SODIUM CHLORIDE 9 MG/ML
INJECTION, SOLUTION INTRAVENOUS PRN
Status: DISCONTINUED | OUTPATIENT
Start: 2025-07-31 | End: 2025-07-31 | Stop reason: HOSPADM

## 2025-07-31 RX ORDER — ONDANSETRON 2 MG/ML
4 INJECTION INTRAMUSCULAR; INTRAVENOUS ONCE
Status: COMPLETED | OUTPATIENT
Start: 2025-07-31 | End: 2025-07-31

## 2025-07-31 RX ORDER — SODIUM CHLORIDE 0.9 % (FLUSH) 0.9 %
5-40 SYRINGE (ML) INJECTION PRN
Status: DISCONTINUED | OUTPATIENT
Start: 2025-07-31 | End: 2025-07-31 | Stop reason: HOSPADM

## 2025-07-31 RX ADMIN — LIDOCAINE HYDROCHLORIDE 100 MG: 20 INJECTION, SOLUTION EPIDURAL; INFILTRATION; INTRACAUDAL; PERINEURAL at 10:14

## 2025-07-31 RX ADMIN — ONDANSETRON 4 MG: 2 INJECTION, SOLUTION INTRAMUSCULAR; INTRAVENOUS at 10:01

## 2025-07-31 RX ADMIN — PROPOFOL 80 MG: 10 INJECTION, EMULSION INTRAVENOUS at 10:14

## 2025-07-31 RX ADMIN — LIDOCAINE HYDROCHLORIDE 1 ML: 10 INJECTION, SOLUTION EPIDURAL; INFILTRATION; INTRACAUDAL; PERINEURAL at 10:01

## 2025-07-31 RX ADMIN — SODIUM CHLORIDE: 0.9 INJECTION, SOLUTION INTRAVENOUS at 10:00

## 2025-07-31 RX ADMIN — FAMOTIDINE 20 MG: 10 INJECTION, SOLUTION INTRAVENOUS at 10:03

## 2025-07-31 RX ADMIN — PROPOFOL 140 MCG/KG/MIN: 10 INJECTION, EMULSION INTRAVENOUS at 10:15

## 2025-07-31 ASSESSMENT — PAIN - FUNCTIONAL ASSESSMENT
PAIN_FUNCTIONAL_ASSESSMENT: ADULT NONVERBAL PAIN SCALE (NPVS)
PAIN_FUNCTIONAL_ASSESSMENT: 0-10

## 2025-07-31 ASSESSMENT — ENCOUNTER SYMPTOMS
SHORTNESS OF BREATH: 0
SHORTNESS OF BREATH: 0
NAUSEA: 0
ABDOMINAL PAIN: 0
SINUS PRESSURE: 0

## 2025-07-31 NOTE — OP NOTE
Colonoscopy report    Colonoscopy Procedure Note    Procedure:  Colonoscopy with polypectomy with snare and biopsy forceps    Procedure Date: 7/31/2025    Indications: Colon cancer screening, altered bowel habits with constipation dominant symptoms.    Sedation:  MAC    Attending Physician:  Dr. Jesse Krishna MD.     Assistant Physician: None    Consent:   Informed consent was obtained for the procedure after explaining the risks including bleeding, perforation, aspiration, arrhythmia, risks related to sedation, reaction to medications and rarely death, benefits and alternatives to the patient. The patient verbalized understanding and agreed to proceed with the procedure.       Procedure Details:  The patient was placed in the left lateral decubitus position.  Oxygen and cardiac monitoring equipment was attached. The patient's vital signs were monitored continuously  throughout the procedure. After appropriate sedation was achieved, a rectal examination was performed.  The pediatric colonoscope was inserted into the rectum and advanced under direct vision to the cecum, which was identified by the ileocecal valve and appendiceal orifice.  The quality of the colonic preparation was fair.  A careful inspection was made as the colonoscope was withdrawn, including a retroflexed view of the rectum; findings and interventions are described below.  Appropriate photodocumentation was obtained.           Complications:  None    Estimated blood loss:  Minimal           Disposition:  Home           Condition: stable    Withdrawal Time: 23 minutes    Findings:   The bowel prep was fair.  Multiple small and large diverticula noted throughout the colon.  A 2 mm cecal polyp resected with cold biopsy forceps  A 1.2 cm polyp noted in the cecum taken with hot snare in piecemeal.  2 polyps noted in the ascending colon measuring 3 to 5 mm, taken with cold snare  A 7 mm transverse colon polyp noted that was taken with hot snare  6 mm

## 2025-07-31 NOTE — H&P
HISTORY and PHYSICAL  [facility]       NAME:  Lucero Rose  MRN: [id number]   YOB: 1955   Date: 7/31/2025   Age: 70 y.o.  Gender: female       COMPLAINT AND PRESENT HISTORY:        Lucero Rose is 70 y.o.,   female, here for Procedure(s):  ESOPHAGOGASTRODUODENOSCOPY BIOPSY  COLONOSCOPY DIAGNOSTIC    Pt is being seen for :  Pre-Op Diagnosis Codes:      * Bloating      * Abdominal pain, unspecified abdominal location      * Altered bowel habits      * Colon cancer screening     Last colonoscopy done 2016.  prior EGD done before unknown date.   Pt has hx of diverticulosis    No  abdominal pain  no  dysphagia  No   heartburn. Pt has hx of GERD.    Yes  nausea, vomiting,  Sometimes  diarrhea, constipation.  No  blood in stool, dark tarry stools.  Yes  changes in appetite lack of appetite and unintended weight loss.  No  family history of colon cancer.  No  hx of smoking.     Medical history reviewed. Hx of blood clots, HTN, HLD, DM, , WILLARD does not CPAP,  Thyroid dz,   Denies chest pain/pressure, palpitations.  Pt admits, recent URI, fever or chills.     blood thinning medications:  no.  Pt held her Mounjaro    Completed and followed prescribed prep.     Anesthesia complication- pt can't lay flat- has trouble breathing due to sinus issues     RECENT LABS, IMAGING AND TESTING     Lab Results   Component Value Date    WBC 7.1 05/28/2025    RBC 4.72 05/28/2025    HGB 15.3 05/28/2025    HCT 43.0 05/28/2025    MCV 91.1 05/28/2025    MCH 32.4 05/28/2025    MCHC 35.6 05/28/2025    RDW 12.5 05/28/2025    PLT See Reflexed IPF Result 05/28/2025    MPV 10.3 05/28/2025        Lab Results   Component Value Date     05/28/2025    K 4.3 05/28/2025     05/28/2025    CO2 24 05/28/2025    BUN 13 05/28/2025    CREATININE 1.0 05/28/2025    GLUCOSE 202 (H) 05/28/2025    CALCIUM 9.7 05/28/2025    BILITOT 0.8 04/17/2025    ALKPHOS 98 04/17/2025    AST 17 04/17/2025    ALT 17 04/17/2025

## 2025-07-31 NOTE — ANESTHESIA PRE PROCEDURE
Department of Anesthesiology  Preprocedure Note       Name:  Lucero Rose   Age:  70 y.o.  :  1955                                          MRN:  [id number]         Date:  2025      Surgeon: Surgeon(s):  Jesse Krishna MD    Procedure: Procedure(s):  ESOPHAGOGASTRODUODENOSCOPY BIOPSY  COLONOSCOPY DIAGNOSTIC    Medications prior to admission:   Prior to Admission medications    Medication Sig Start Date End Date Taking? Authorizing Provider   simethicone (MYLICON) 80 MG chewable tablet Take 1 tablet by mouth 4 times daily as needed for Flatulence 25  Yes Jesse Krishna MD   polyethylene glycol-electrolytes (NULYTELY) 420 g solution Take as directed for bowel prep. 25  Yes Jesse Krishna MD   pitavastatin (LIVALO) 4 MG TABS tablet Take 1 tablet by mouth nightly 25  Yes Lauren Miller MD   amLODIPine (NORVASC) 5 MG tablet Take 1 tablet by mouth daily 25  Yes Lauren Miller MD   carvedilol (COREG) 25 MG tablet Take 1 tablet by mouth 2 times daily (with meals) 25  Yes Lauren Miller MD   EPINEPHrine (EPIPEN) 0.3 MG/0.3ML SOAJ injection Inject 0.3 mLs into the muscle as needed (allegic reaction) Use as directed for allergic reaction 25  Yes Lauren Miller MD   levothyroxine (SYNTHROID) 25 MCG tablet Take 1 tablet by mouth every morning Take on an empty stomach. 25  Yes Lauren Miller MD   spironolactone (ALDACTONE) 50 MG tablet Take 1 tablet by mouth daily 23  Yes Maksim Baltazar MD   insulin lispro, 0.5 Unit Dial, 100 UNIT/ML SOPN Inject into the skin Sliding scale  Humalog 20  Yes Maksim Baltazar MD   vitamin D (CHOLECALCIFEROL) 25 MCG (1000 UT) TABS tablet Take 1 tablet by mouth daily 21  Yes Maksim Baltazar MD   vitamin B-12 (CYANOCOBALAMIN) 1000 MCG tablet take 1 tablet by mouth once daily 22  Yes Bryn Mccann MD   insulin glargine (LANTUS) 100 UNIT/ML injection vial Inject 90 Units into the skin 2 times daily   Yes Provider,

## 2025-07-31 NOTE — OP NOTE
EGD report    Esophagogastroduodenoscopy (EGD) Procedure Note    Procedure:  EGD with biopsies    Procedure Date: 7/31/2025    Indications: Abdominal pain, nausea/vomiting, bloating, altered bowel habits    Sedation: MAC    Attending Physician:  Dr. Jesse Krishna MD    Assistant:  None    Procedure Details:    Informed consent was obtained for the procedure, including sedation. Risks of infection, perforation, hemorrhage, adverse drug reaction, and aspiration were discussed. The patient was placed in the left lateral decubitus position. The patient was monitored continuously with ECG tracing, pulse oximetry, blood pressure monitoring, and direct observation.      The gastroscope was inserted into the mouth and advanced under direct vision to second portion of the duodenum.  A careful inspection was made as the gastroscope was withdrawn, including a retroflexed view of the proximal stomach; findings and interventions are described below. Appropriate photodocumentation was obtained.    Findings:  Retropharyngeal area was grossly normal appearing     Esophagus: normal, biopsies obtained                          Esophagogastric markings: Diaphragmatic hiatus-37 cm; GE junction-36 cm, a 1 to 2 cm sliding hiatal hernia noted     Stomach:    Fundus: normal    Body: normal    Antrum: Few scattered erosions and erythema, biopsies obtained to rule out H. pylori     Duodenum:     Bulb: Few scattered erosions and erythema    First part: Normal    Second Part: Normal  Biopsies obtained to rule out celiac disease    Complications:  None           Estimated blood loss:  Minimal    Disposition:  [facility]           Condition: Stable    Specimen Removed: Esophagus, stomach, duodenum    Impression:    1 to 2 cm sliding hiatal hernia.  Esophagus biopsies obtained.  Erythematous/erosive gastropathy, biopsies obtained.  Erythematous/erosive duodenopathy, biopsies obtained    Recommendations:  Follow pathology results.  Avoid

## 2025-07-31 NOTE — ANESTHESIA POSTPROCEDURE EVALUATION
Department of Anesthesiology  Postprocedure Note    Patient: Lucero Rose  MRN: [id number]  YOB: 1955  Date of evaluation: 7/31/2025    Procedure Summary       Date: 07/31/25 Room / Location: [place] [id number] / [facility]    Anesthesia Start: 1010 Anesthesia Stop: 1104    Procedures:       ESOPHAGOGASTRODUODENOSCOPY BIOPSY (Esophagus)      COLONOSCOPY POLYPECTOMY SNARE/BIOPSY Diagnosis:       Bloating      Abdominal pain, unspecified abdominal location      Altered bowel habits      Colon cancer screening      (Bloating [R14.0])      (Abdominal pain, unspecified abdominal location [R10.9])      (Altered bowel habits [R19.4])      (Colon cancer screening [Z12.11])    Surgeons: Jesse Krishna MD Responsible Provider: Elisa Greene MD    Anesthesia Type: general, TIVA ASA Status: 3            Anesthesia Type: No value filed.    Narcisa Phase I: Narcisa Score: 10    Narcisa Phase II: Narcisa Score: 10    Anesthesia Post Evaluation    Comments: POST- ANESTHESIA EVALUATION       Pt Name: Lucero Rose  MRN: [id number]  YOB: 1955  Date of evaluation: 7/31/2025  Time:  12:22 PM      /67   Pulse 56   Temp 98.2 °F (36.8 °C)   Resp 11   Ht 1.651 m (5' 5\")   Wt 104.8 kg (231 lb)   SpO2 95%   BMI 38.44 kg/m²      Consciousness Level  Awake  Cardiopulmonary Status  Stable  Pain Adequately Treated YES  Nausea / Vomiting  NO  Adequate Hydration  YES  Anesthesia Related Complications NONE      Electronically signed by Elisa Greene MD on 7/31/2025 at 12:22 PM      No notable events documented.

## 2025-08-01 PROBLEM — Z12.11 COLON CANCER SCREENING: Status: RESOLVED | Noted: 2025-07-02 | Resolved: 2025-08-01

## 2025-08-01 LAB — SURGICAL PATHOLOGY REPORT: NORMAL

## 2025-08-29 ENCOUNTER — OFFICE VISIT (OUTPATIENT)
Age: 70
End: 2025-08-29

## 2025-08-29 VITALS
TEMPERATURE: 97.7 F | DIASTOLIC BLOOD PRESSURE: 58 MMHG | BODY MASS INDEX: 38.25 KG/M2 | RESPIRATION RATE: 16 BRPM | WEIGHT: 229.6 LBS | HEIGHT: 65 IN | SYSTOLIC BLOOD PRESSURE: 128 MMHG | HEART RATE: 56 BPM

## 2025-08-29 DIAGNOSIS — Z00.00 MEDICARE ANNUAL WELLNESS VISIT, SUBSEQUENT: Primary | ICD-10-CM

## 2025-08-29 DIAGNOSIS — Z78.0 POST-MENOPAUSAL: ICD-10-CM

## 2025-08-29 ASSESSMENT — PATIENT HEALTH QUESTIONNAIRE - PHQ9
SUM OF ALL RESPONSES TO PHQ QUESTIONS 1-9: 0
SUM OF ALL RESPONSES TO PHQ QUESTIONS 1-9: 0
2. FEELING DOWN, DEPRESSED OR HOPELESS: NOT AT ALL
SUM OF ALL RESPONSES TO PHQ QUESTIONS 1-9: 0
SUM OF ALL RESPONSES TO PHQ QUESTIONS 1-9: 0
1. LITTLE INTEREST OR PLEASURE IN DOING THINGS: NOT AT ALL

## 2025-08-29 ASSESSMENT — LIFESTYLE VARIABLES
HOW MANY STANDARD DRINKS CONTAINING ALCOHOL DO YOU HAVE ON A TYPICAL DAY: PATIENT DOES NOT DRINK
HOW OFTEN DO YOU HAVE A DRINK CONTAINING ALCOHOL: NEVER
HOW MANY STANDARD DRINKS CONTAINING ALCOHOL DO YOU HAVE ON A TYPICAL DAY: PATIENT DOES NOT DRINK
HOW OFTEN DO YOU HAVE A DRINK CONTAINING ALCOHOL: NEVER

## (undated) DEVICE — GLOVE SURG SZ 75 L12IN FNGR THK79MIL GRN LTX FREE

## (undated) DEVICE — GAUZE,SPONGE,FLUFF,6"X6.75",STRL,5/TRAY: Brand: MEDLINE

## (undated) DEVICE — BLADE ES ELASTOMERIC COAT INSUL DURABLE BEND UPTO 90DEG

## (undated) DEVICE — MASTISOL ADHESIVE LIQ 2/3ML

## (undated) DEVICE — SUTURE VICRYL + SZ 3-0 L18IN ABSRB UD SH 1/2 CIR TAPERCUT NDL VCP864D

## (undated) DEVICE — BANDAGE,GAUZE,BULKEE II,4.5"X4.1YD,STRL: Brand: MEDLINE

## (undated) DEVICE — ST. CHARLES BASIC SET UP: Brand: MEDLINE INDUSTRIES, INC.

## (undated) DEVICE — SOLUTION IRRIG 1000ML 09% SOD CHL USP PIC PLAS CONTAINER

## (undated) DEVICE — SUTURE PERMA-HAND SZ 2-0 L30IN NONABSORBABLE BLK L26MM SH K833H

## (undated) DEVICE — SUTURE ETHILON SZ 2-0 L18IN NONABSORBABLE BLK L26MM FS 3/8 664G

## (undated) DEVICE — POLYP TRAP: Brand: TRAPEASE®

## (undated) DEVICE — GLOVE ORANGE PI 7 1/2   MSG9075

## (undated) DEVICE — NDL CNTR 40CT FM MAG: Brand: MEDLINE INDUSTRIES, INC.

## (undated) DEVICE — BITEBLOCK 54FR W/ DENT RIM BLOX

## (undated) DEVICE — STRIP,CLOSURE,WOUND,MEDI-STRIP,1/2X4: Brand: MEDLINE

## (undated) DEVICE — SUTURE VICRYL + SZ 2-0 L18IN ABSRB UD CT1 L36MM 1/2 CIR VCP839D

## (undated) DEVICE — SINGLE PORT MANIFOLD: Brand: NEPTUNE 2

## (undated) DEVICE — DRAIN SURG 19FR 100% SIL RADPQ RND CHN FULL FLUT

## (undated) DEVICE — APPLIER LIG CLP M L11IN TI STR RNG HNDL FOR 20 CLP DISP

## (undated) DEVICE — SUTURE MONOCRYL + SZ 4-0 L27IN ABSRB UD L19MM PS-2 3/8 CIR MCP426H

## (undated) DEVICE — SOLUTION IRRIG 1000ML STRL H2O USP PLAS POUR BTL

## (undated) DEVICE — SPONGE LAP W18XL18IN WHT COT 4 PLY FLD STRUNG RADPQ DISP ST 2 PER PACK

## (undated) DEVICE — DRAPE,T,LAPARO,TRANS,STERILE: Brand: MEDLINE

## (undated) DEVICE — FORCEPS BX L240CM WRK CHN 2.8MM STD CAP W/ NDL MIC MESH

## (undated) DEVICE — ENDOSCOPIC KIT 1.1+ 10 FT OP4 CA DE

## (undated) DEVICE — SYRINGE IRRIG 60ML SFT PLIABLE BLB EZ TO GRP 1 HND USE W/

## (undated) DEVICE — SOLUTION IRRIG 1000ML 0.9% SOD CHL USP POUR PLAS BTL

## (undated) DEVICE — BRA COMPR 2XL NYL LYCRA SPANDEX WHT CURAD

## (undated) DEVICE — DRAPE,UTILTY,TAPE,15X26, 4EA/PK: Brand: MEDLINE

## (undated) DEVICE — SUTURE VICRYL + SZ 3-0 L27IN ABSRB UD L26MM SH 1/2 CIR VCP416H

## (undated) DEVICE — GOWN,NON-REINFORCED,3XL: Brand: MEDLINE

## (undated) DEVICE — PAD,ABDOMINAL,8"X7.5",ST,LF,20/BX: Brand: MEDLINE INDUSTRIES, INC.

## (undated) DEVICE — GOWN,POLY REINFORCED,LG: Brand: MEDLINE

## (undated) DEVICE — APPLIER CLP L L13IN TI MULT RNG HNDL 20 CLP STR LIGACLP

## (undated) DEVICE — SUTURE PERMAHAND SZ 0 L30IN NONABSORBABLE BLK FSL L30MM 3/8 680H

## (undated) DEVICE — RESERVOIR,SUCTION,100CC,SILICONE: Brand: MEDLINE

## (undated) DEVICE — BLANKET WRM W40.2XL55.9IN IORT LO BODY + MISTRAL AIR

## (undated) DEVICE — GAUZE,SPONGE,4"X4",16PLY,STRL,LF,10/TRAY: Brand: MEDLINE

## (undated) DEVICE — SNARE ENDO CAPTIVATOR W10MM X L240CM RND INSUL STIFF-UOM BOX OF 10

## (undated) DEVICE — GOWN,SIRUS,NONRNF,3XL,18/CS: Brand: MEDLINE

## (undated) DEVICE — BRA COMPR 3XL NYL LYCRA SPANDEX CURAD

## (undated) DEVICE — ERBE NESSY® OMEGA PLATE USA (85+23)CM² , WITH CABLE 3 M: Brand: ERBE

## (undated) DEVICE — SHEET,DRAPE,53X77,STERILE: Brand: MEDLINE

## (undated) DEVICE — SINGLE USE AIR/WATER, SUCTION AND BIOPSY VALVES SET: Brand: ORCAPOD™

## (undated) DEVICE — SOLUTION IRRIG 1000ML H2O PIC PLAS SHATTERPROOF CONTAINER

## (undated) DEVICE — SPONGE DRN W4XL4IN RAYON/POLYESTER 6 PLY NONWOVEN PRECUT 2 PER PK